# Patient Record
Sex: FEMALE | Race: WHITE | Employment: FULL TIME | URBAN - METROPOLITAN AREA
[De-identification: names, ages, dates, MRNs, and addresses within clinical notes are randomized per-mention and may not be internally consistent; named-entity substitution may affect disease eponyms.]

---

## 2018-01-31 ENCOUNTER — HOSPITAL ENCOUNTER (OUTPATIENT)
Age: 43
Discharge: HOME | End: 2018-01-31
Attending: FAMILY MEDICINE
Payer: COMMERCIAL

## 2018-01-31 VITALS
TEMPERATURE: 100 F | BODY MASS INDEX: 41.65 KG/M2 | SYSTOLIC BLOOD PRESSURE: 131 MMHG | OXYGEN SATURATION: 92 % | DIASTOLIC BLOOD PRESSURE: 79 MMHG | HEIGHT: 65 IN | HEART RATE: 80 BPM | RESPIRATION RATE: 18 BRPM | WEIGHT: 250 LBS

## 2018-01-31 DIAGNOSIS — J11.1 INFLUENZA: Primary | ICD-10-CM

## 2018-01-31 DIAGNOSIS — Z72.0 TOBACCO USE: ICD-10-CM

## 2018-01-31 PROCEDURE — 99204 OFFICE O/P NEW MOD 45 MIN: CPT

## 2018-01-31 PROCEDURE — 99203 OFFICE O/P NEW LOW 30 MIN: CPT

## 2018-01-31 RX ORDER — LOSARTAN POTASSIUM 50 MG/1
100 TABLET ORAL DAILY
COMMUNITY
End: 2020-02-27 | Stop reason: CLARIF

## 2018-01-31 RX ORDER — ALBUTEROL SULFATE 90 UG/1
2 AEROSOL, METERED RESPIRATORY (INHALATION) EVERY 4 HOURS PRN
Qty: 1 INHALER | Refills: 0 | Status: SHIPPED | OUTPATIENT
Start: 2018-01-31 | End: 2018-03-02

## 2018-01-31 RX ORDER — CHOLECALCIFEROL (VITAMIN D3) 125 MCG
1 CAPSULE ORAL
COMMUNITY

## 2018-02-01 NOTE — ED INITIAL ASSESSMENT (HPI)
Since Sunday, c/o fever, congested cough, sinus congestion and body aches.   Took Tylenol at 1000 this am.

## 2018-02-01 NOTE — ED PROVIDER NOTES
Patient Seen in: [facility]    History   Patient presents with:  Cough/URI    Stated Complaint: SICK SINCE SUNDAY    HPI    44-year-old female history of hypertension, new onset diabetes, and current tobacco use complains o are clear. MMM, Posterior pharynx is clear. No erythema. No exudate. Bilateral nares are clear. Neck: Supple, NT, FROM. No meningeal signs. LAD: No lymphadenopathy. Heart: S1,S2 RRR, No murmur  Lungs: CTA bilateral. No wheezes rales or rhonchi.   Skin:

## 2020-02-27 ENCOUNTER — HOSPITAL ENCOUNTER (OUTPATIENT)
Age: 45
Discharge: ANOTHER HOSPITAL | End: 2020-02-27
Attending: FAMILY MEDICINE
Payer: COMMERCIAL

## 2020-02-27 ENCOUNTER — APPOINTMENT (OUTPATIENT)
Dept: GENERAL RADIOLOGY | Age: 45
End: 2020-02-27
Attending: FAMILY MEDICINE
Payer: COMMERCIAL

## 2020-02-27 ENCOUNTER — HOSPITAL ENCOUNTER (EMERGENCY)
Facility: HOSPITAL | Age: 45
Discharge: HOME | End: 2020-02-27
Attending: EMERGENCY MEDICINE
Payer: COMMERCIAL

## 2020-02-27 VITALS
OXYGEN SATURATION: 95 % | RESPIRATION RATE: 20 BRPM | DIASTOLIC BLOOD PRESSURE: 76 MMHG | TEMPERATURE: 103 F | WEIGHT: 250 LBS | BODY MASS INDEX: 41.65 KG/M2 | HEART RATE: 88 BPM | SYSTOLIC BLOOD PRESSURE: 127 MMHG | HEIGHT: 65 IN

## 2020-02-27 VITALS
RESPIRATION RATE: 20 BRPM | TEMPERATURE: 99 F | OXYGEN SATURATION: 94 % | BODY MASS INDEX: 41.65 KG/M2 | HEIGHT: 65 IN | DIASTOLIC BLOOD PRESSURE: 71 MMHG | HEART RATE: 85 BPM | SYSTOLIC BLOOD PRESSURE: 131 MMHG | WEIGHT: 250 LBS

## 2020-02-27 DIAGNOSIS — R06.2 WHEEZING: Primary | ICD-10-CM

## 2020-02-27 DIAGNOSIS — J06.9 VIRAL URI WITH COUGH: ICD-10-CM

## 2020-02-27 DIAGNOSIS — J06.9 ACUTE URI: ICD-10-CM

## 2020-02-27 DIAGNOSIS — R09.02 HYPOXIA: ICD-10-CM

## 2020-02-27 LAB
ALBUMIN SERPL-MCNC: 3.4 G/DL (ref 3.4–5)
ALBUMIN SERPL-MCNC: 3.6 G/DL (ref 3.4–5)
ALBUMIN/GLOB SERPL: 0.8 {RATIO} (ref 1–2)
ALBUMIN/GLOB SERPL: 0.9 {RATIO} (ref 1–2)
ALP LIVER SERPL-CCNC: 77 U/L (ref 37–98)
ALP LIVER SERPL-CCNC: 77 U/L (ref 37–98)
ALT SERPL-CCNC: 23 U/L (ref 13–56)
ALT SERPL-CCNC: 25 U/L (ref 13–56)
ANION GAP SERPL CALC-SCNC: 5 MMOL/L (ref 0–18)
ANION GAP SERPL CALC-SCNC: 8 MMOL/L (ref 0–18)
AST SERPL-CCNC: 20 U/L (ref 15–37)
AST SERPL-CCNC: 21 U/L (ref 15–37)
BASOPHILS # BLD AUTO: 0.04 X10(3) UL (ref 0–0.2)
BASOPHILS # BLD AUTO: 0.04 X10(3) UL (ref 0–0.2)
BASOPHILS NFR BLD AUTO: 0.5 %
BASOPHILS NFR BLD AUTO: 0.6 %
BILIRUB SERPL-MCNC: 0.2 MG/DL (ref 0.1–2)
BILIRUB SERPL-MCNC: 0.4 MG/DL (ref 0.1–2)
BILIRUB UR QL STRIP.AUTO: NEGATIVE
BUN BLD-MCNC: 8 MG/DL (ref 7–18)
BUN BLD-MCNC: 8 MG/DL (ref 7–18)
BUN/CREAT SERPL: 8.3 (ref 10–20)
BUN/CREAT SERPL: 8.7 (ref 10–20)
CALCIUM BLD-MCNC: 8.6 MG/DL (ref 8.5–10.1)
CALCIUM BLD-MCNC: 8.6 MG/DL (ref 8.5–10.1)
CHLORIDE SERPL-SCNC: 98 MMOL/L (ref 98–112)
CHLORIDE SERPL-SCNC: 98 MMOL/L (ref 98–112)
CLARITY UR REFRACT.AUTO: CLEAR
CO2 SERPL-SCNC: 27 MMOL/L (ref 21–32)
CO2 SERPL-SCNC: 30 MMOL/L (ref 21–32)
COLOR UR AUTO: YELLOW
CREAT BLD-MCNC: 0.92 MG/DL (ref 0.55–1.02)
CREAT BLD-MCNC: 0.96 MG/DL (ref 0.55–1.02)
DEPRECATED RDW RBC AUTO: 39.4 FL (ref 35.1–46.3)
DEPRECATED RDW RBC AUTO: 39.8 FL (ref 35.1–46.3)
EOSINOPHIL # BLD AUTO: 0 X10(3) UL (ref 0–0.7)
EOSINOPHIL # BLD AUTO: 0.01 X10(3) UL (ref 0–0.7)
EOSINOPHIL NFR BLD AUTO: 0 %
EOSINOPHIL NFR BLD AUTO: 0.2 %
ERYTHROCYTE [DISTWIDTH] IN BLOOD BY AUTOMATED COUNT: 12.3 % (ref 11–15)
ERYTHROCYTE [DISTWIDTH] IN BLOOD BY AUTOMATED COUNT: 12.4 % (ref 11–15)
GLOBULIN PLAS-MCNC: 4.1 G/DL (ref 2.8–4.4)
GLOBULIN PLAS-MCNC: 4.5 G/DL (ref 2.8–4.4)
GLUCOSE BLD-MCNC: 185 MG/DL (ref 70–99)
GLUCOSE BLD-MCNC: 229 MG/DL (ref 70–99)
GLUCOSE UR STRIP.AUTO-MCNC: NEGATIVE MG/DL
HCT VFR BLD AUTO: 44.4 % (ref 35–48)
HCT VFR BLD AUTO: 45.3 % (ref 35–48)
HCT VFR BLD AUTO: 45.5 % (ref 35–48)
HGB BLD-MCNC: 14.9 G/DL (ref 12–16)
HGB BLD-MCNC: 14.9 G/DL (ref 12–16)
HGB BLD-MCNC: 15.3 G/DL (ref 12–16)
IMM GRANULOCYTES # BLD AUTO: 0.03 X10(3) UL (ref 0–1)
IMM GRANULOCYTES # BLD AUTO: 0.06 X10(3) UL (ref 0–1)
IMM GRANULOCYTES NFR BLD: 0.5 %
IMM GRANULOCYTES NFR BLD: 0.7 %
KETONES UR STRIP.AUTO-MCNC: NEGATIVE MG/DL
LEUKOCYTE ESTERASE UR QL STRIP.AUTO: NEGATIVE
LYMPHOCYTES # BLD AUTO: 0.89 X10(3) UL (ref 1–4)
LYMPHOCYTES # BLD AUTO: 1.61 X10(3) UL (ref 1–4)
LYMPHOCYTES NFR BLD AUTO: 10.8 %
LYMPHOCYTES NFR BLD AUTO: 24.6 %
M PROTEIN MFR SERPL ELPH: 7.7 G/DL (ref 6.4–8.2)
M PROTEIN MFR SERPL ELPH: 7.9 G/DL (ref 6.4–8.2)
MCH RBC QN AUTO: 28.9 PG (ref 26–34)
MCH RBC QN AUTO: 29.3 PG (ref 26–34)
MCH RBC QN AUTO: 29.4 PG (ref 26–34)
MCHC RBC AUTO-ENTMCNC: 32.9 G/DL (ref 31–37)
MCHC RBC AUTO-ENTMCNC: 33.6 G/DL (ref 31–37)
MCHC RBC AUTO-ENTMCNC: 33.6 G/DL (ref 31–37)
MCV RBC AUTO: 87.3 FL (ref 80–100)
MCV RBC AUTO: 87.4 FL (ref 80–100)
MCV RBC AUTO: 88 FL (ref 80–100)
MONOCYTES # BLD AUTO: 0.64 X10(3) UL (ref 0.1–1)
MONOCYTES # BLD AUTO: 0.99 X10(3) UL (ref 0.1–1)
MONOCYTES NFR BLD AUTO: 15.1 %
MONOCYTES NFR BLD AUTO: 7.8 %
NEUTROPHILS # BLD AUTO: 3.86 X10 (3) UL (ref 1.5–7.7)
NEUTROPHILS # BLD AUTO: 3.86 X10(3) UL (ref 1.5–7.7)
NEUTROPHILS # BLD AUTO: 6.59 X10 (3) UL (ref 1.5–7.7)
NEUTROPHILS # BLD AUTO: 6.59 X10(3) UL (ref 1.5–7.7)
NEUTROPHILS NFR BLD AUTO: 59 %
NEUTROPHILS NFR BLD AUTO: 80.2 %
NITRITE UR QL STRIP.AUTO: NEGATIVE
OSMOLALITY SERPL CALC.SUM OF ELEC: 279 MOSM/KG (ref 275–295)
OSMOLALITY SERPL CALC.SUM OF ELEC: 282 MOSM/KG (ref 275–295)
PATIENT FASTING Y/N/NP: NO
PH UR STRIP.AUTO: 6 [PH] (ref 4.5–8)
PLATELET # BLD AUTO: 246 10(3)UL (ref 150–450)
PLATELET # BLD AUTO: 246 X10ˆ3/UL (ref 150–450)
PLATELET # BLD AUTO: 262 10(3)UL (ref 150–450)
POCT INFLUENZA A: NEGATIVE
POCT INFLUENZA B: NEGATIVE
POTASSIUM SERPL-SCNC: 3.3 MMOL/L (ref 3.5–5.1)
POTASSIUM SERPL-SCNC: 3.3 MMOL/L (ref 3.5–5.1)
PROT UR STRIP.AUTO-MCNC: NEGATIVE MG/DL
RBC # BLD AUTO: 5.08 X10(6)UL (ref 3.8–5.3)
RBC # BLD AUTO: 5.15 X10ˆ6/UL (ref 3.8–5.3)
RBC # BLD AUTO: 5.21 X10(6)UL (ref 3.8–5.3)
SODIUM SERPL-SCNC: 133 MMOL/L (ref 136–145)
SODIUM SERPL-SCNC: 133 MMOL/L (ref 136–145)
SP GR UR STRIP.AUTO: 1.01 (ref 1–1.03)
UROBILINOGEN UR STRIP.AUTO-MCNC: <2 MG/DL
WBC # BLD AUTO: 6.5 X10(3) UL (ref 4–11)
WBC # BLD AUTO: 6.5 X10ˆ3/UL (ref 4–11)
WBC # BLD AUTO: 8.2 X10(3) UL (ref 4–11)

## 2020-02-27 PROCEDURE — 81001 URINALYSIS AUTO W/SCOPE: CPT | Performed by: NURSE PRACTITIONER

## 2020-02-27 PROCEDURE — 94640 AIRWAY INHALATION TREATMENT: CPT

## 2020-02-27 PROCEDURE — 36415 COLL VENOUS BLD VENIPUNCTURE: CPT

## 2020-02-27 PROCEDURE — 99284 EMERGENCY DEPT VISIT MOD MDM: CPT

## 2020-02-27 PROCEDURE — 80053 COMPREHEN METABOLIC PANEL: CPT | Performed by: NURSE PRACTITIONER

## 2020-02-27 PROCEDURE — 96360 HYDRATION IV INFUSION INIT: CPT

## 2020-02-27 PROCEDURE — 99215 OFFICE O/P EST HI 40 MIN: CPT

## 2020-02-27 PROCEDURE — 87502 INFLUENZA DNA AMP PROBE: CPT | Performed by: FAMILY MEDICINE

## 2020-02-27 PROCEDURE — 81002 URINALYSIS NONAUTO W/O SCOPE: CPT | Performed by: FAMILY MEDICINE

## 2020-02-27 PROCEDURE — 99214 OFFICE O/P EST MOD 30 MIN: CPT

## 2020-02-27 PROCEDURE — 71046 X-RAY EXAM CHEST 2 VIEWS: CPT | Performed by: FAMILY MEDICINE

## 2020-02-27 PROCEDURE — 80053 COMPREHEN METABOLIC PANEL: CPT | Performed by: FAMILY MEDICINE

## 2020-02-27 PROCEDURE — 85025 COMPLETE CBC W/AUTO DIFF WBC: CPT | Performed by: FAMILY MEDICINE

## 2020-02-27 RX ORDER — PREDNISONE 20 MG/1
20 TABLET ORAL 2 TIMES DAILY
Qty: 10 TABLET | Refills: 0 | Status: SHIPPED | OUTPATIENT
Start: 2020-02-27 | End: 2020-03-03

## 2020-02-27 RX ORDER — ALBUTEROL SULFATE 90 UG/1
2 AEROSOL, METERED RESPIRATORY (INHALATION) EVERY 4 HOURS PRN
Qty: 1 INHALER | Refills: 0 | Status: SHIPPED | OUTPATIENT
Start: 2020-02-27 | End: 2020-03-28

## 2020-02-27 RX ORDER — PREDNISONE 20 MG/1
60 TABLET ORAL ONCE
Status: COMPLETED | OUTPATIENT
Start: 2020-02-27 | End: 2020-02-27

## 2020-02-27 RX ORDER — METFORMIN HYDROCHLORIDE 500 MG/1
500 TABLET, EXTENDED RELEASE ORAL DAILY
COMMUNITY

## 2020-02-27 RX ORDER — IPRATROPIUM BROMIDE AND ALBUTEROL SULFATE 2.5; .5 MG/3ML; MG/3ML
3 SOLUTION RESPIRATORY (INHALATION) ONCE
Status: COMPLETED | OUTPATIENT
Start: 2020-02-27 | End: 2020-02-27

## 2020-02-27 RX ORDER — AMLODIPINE BESYLATE 5 MG/1
5 TABLET ORAL DAILY
COMMUNITY

## 2020-02-27 RX ORDER — IBUPROFEN 600 MG/1
600 TABLET ORAL ONCE
Status: COMPLETED | OUTPATIENT
Start: 2020-02-27 | End: 2020-02-27

## 2020-02-27 RX ORDER — BENZONATATE 100 MG/1
100 CAPSULE ORAL 3 TIMES DAILY PRN
Qty: 30 CAPSULE | Refills: 0 | Status: SHIPPED | OUTPATIENT
Start: 2020-02-27 | End: 2020-03-28

## 2020-02-27 NOTE — ED INITIAL ASSESSMENT (HPI)
Pt c/o cough and fever starting yesterday. Pt states she's having coughing spasms to the point where she throws up.

## 2020-02-27 NOTE — ED NOTES
Nebulizer complete. Pt. Reports feeling better after nebulizer treatment. Dr. Lakeshia Johnson updated.

## 2020-02-27 NOTE — ED PROVIDER NOTES
Patient Seen in: [facility]      History   Patient presents with:  Cough/URI  Fever    Stated Complaint: short of breath, fever x 2 days    HPI    ***48-year-old female presents with flulike symptoms since yesterday.   She h tympanic membrane is clear without any redness  Oropharynx : No erythema no exudates. Neck supple full range of motion,   Lungs: Diminished breath sounds bilaterally with expiratory wheezing bilaterally.   Heart S1-S2 heard no murmurs no gallops  Skin show

## 2020-02-28 NOTE — ED INITIAL ASSESSMENT (HPI)
Pt reports sob and cough for a couple of days. Went to intermediate care and they reported low pulse ox and sent her to the ED.

## 2020-02-28 NOTE — ED PROVIDER NOTES
Patient Seen in: [facility]      History   Patient presents with:  Cough/URI  Fever    Stated Complaint: short of breath, fever x 2 days    HPI    70-year-old female presents with complaints of shortness of breath or wheezi range of motion, no cervical lymphadenopathy   Heart S1-S2 heard no murmurs no gallops  Skin shows no rash  Diffuse expiratory wheezing with diminished breath sounds with mild respiratory distress appreciated.     ED Course     Labs Reviewed   POCT FLU TEST 6:44 pm    Follow-up:  [facility] SHEBHARATH [phone]  Go today          Medications Prescribed:  Current Discharge Medication List

## 2020-02-28 NOTE — ED PROVIDER NOTES
Patient Seen in: [facility] Emergency Department      History   Patient presents with:  Dyspnea CALLIE SOB    Stated Complaint: CALLIE sent from [address] [place] [address]    54-year-old female who presents to the emergency room from the local walk-in immediate care with complai of Systems   Constitutional: Positive for chills and fever. HENT: Positive for congestion. Respiratory: Positive for cough and wheezing. Skin: Negative. Hematological: Negative. Psychiatric/Behavioral: Negative.     All other systems reviewed Potassium 3.3 (*)     BUN/CREA Ratio 8.3 (*)     Globulin  4.5 (*)     A/G Ratio 0.8 (*)     All other components within normal limits   URINALYSIS WITH CULTURE REFLEX - Abnormal; Notable for the following components:    Blood Urine Small (*)     Squamous patient's oxygen saturation is at 95 to 96% on room air. Lungs are clear to auscultation bilaterally. Equal rise and fall of chest wall symmetrically. Patient is in no acute distress or respiratory stress at this time.      I discussed case with Dr. Daria Wilcox (three) times daily as needed for cough. Qty: 30 capsule Refills: 0    Albuterol Sulfate  (90 Base) MCG/ACT Inhalation Aero Soln  Inhale 2 puffs into the lungs every 4 (four) hours as needed for Wheezing.   Qty: 1 Inhaler Refills: 0

## 2024-06-24 ENCOUNTER — HOSPITAL ENCOUNTER (OUTPATIENT)
Facility: HOSPITAL | Age: 49
Setting detail: OBSERVATION
Discharge: HOME | End: 2024-06-25
Attending: STUDENT IN AN ORGANIZED HEALTH CARE EDUCATION/TRAINING PROGRAM
Payer: COMMERCIAL

## 2024-06-24 ENCOUNTER — APPOINTMENT (OUTPATIENT)
Dept: CT IMAGING | Facility: HOSPITAL | Age: 49
End: 2024-06-24
Attending: NURSE PRACTITIONER
Payer: COMMERCIAL

## 2024-06-24 ENCOUNTER — HOSPITAL ENCOUNTER (OUTPATIENT)
Age: 49
Discharge: HOME | End: 2024-06-24
Payer: COMMERCIAL

## 2024-06-24 ENCOUNTER — ANESTHESIA (OUTPATIENT)
Dept: SURGERY | Facility: HOSPITAL | Age: 49
End: 2024-06-24
Payer: COMMERCIAL

## 2024-06-24 VITALS
HEART RATE: 101 BPM | DIASTOLIC BLOOD PRESSURE: 83 MMHG | SYSTOLIC BLOOD PRESSURE: 123 MMHG | OXYGEN SATURATION: 94 % | BODY MASS INDEX: 36 KG/M2 | TEMPERATURE: 98 F | RESPIRATION RATE: 18 BRPM | WEIGHT: 215 LBS

## 2024-06-24 DIAGNOSIS — K35.80 ACUTE APPENDICITIS: ICD-10-CM

## 2024-06-24 DIAGNOSIS — R10.9 ABDOMINAL PAIN OF UNKNOWN ETIOLOGY: Primary | ICD-10-CM

## 2024-06-24 DIAGNOSIS — K35.80 ACUTE APPENDICITIS, UNSPECIFIED ACUTE APPENDICITIS TYPE: ICD-10-CM

## 2024-06-24 PROBLEM — E27.8 ADRENAL MASS (HCC): Status: ACTIVE | Noted: 2024-06-24

## 2024-06-24 PROBLEM — D72.829 LEUKOCYTOSIS: Status: ACTIVE | Noted: 2024-06-24

## 2024-06-24 LAB
#MXD IC: 1.9 X10ˆ3/UL (ref 0.1–1)
BASOPHILS # BLD AUTO: 0.07 X10(3) UL (ref 0–0.2)
BASOPHILS NFR BLD AUTO: 0.4 %
BUN BLD-MCNC: 7 MG/DL (ref 7–18)
CHLORIDE BLD-SCNC: 95 MMOL/L (ref 98–112)
CO2 BLD-SCNC: 31 MMOL/L (ref 21–32)
CREAT BLD-MCNC: 0.7 MG/DL
EGFRCR SERPLBLD CKD-EPI 2021: 105 ML/MIN/1.73M2 (ref 60–?)
EOSINOPHIL # BLD AUTO: 0.2 X10(3) UL (ref 0–0.7)
EOSINOPHIL NFR BLD AUTO: 1.2 %
ERYTHROCYTE [DISTWIDTH] IN BLOOD BY AUTOMATED COUNT: 12.6 %
EST. AVERAGE GLUCOSE BLD GHB EST-MCNC: 192 MG/DL (ref 68–126)
GLUCOSE BLD-MCNC: 207 MG/DL (ref 70–99)
GLUCOSE BLD-MCNC: 232 MG/DL (ref 70–99)
HBA1C MFR BLD: 8.3 % (ref ?–5.7)
HCG UR QL: NEGATIVE
HCT VFR BLD AUTO: 44.5 %
HCT VFR BLD AUTO: 44.9 %
HCT VFR BLD CALC: 48 %
HGB BLD-MCNC: 15.3 G/DL
HGB BLD-MCNC: 15.7 G/DL
IMM GRANULOCYTES # BLD AUTO: 0.17 X10(3) UL (ref 0–1)
IMM GRANULOCYTES NFR BLD: 1 %
ISTAT IONIZED CALCIUM FOR CHEM 8: 1.06 MMOL/L (ref 1.12–1.32)
LYMPHOCYTES # BLD AUTO: 2.5 X10ˆ3/UL (ref 1–4)
LYMPHOCYTES # BLD AUTO: 2.61 X10(3) UL (ref 1–4)
LYMPHOCYTES NFR BLD AUTO: 14.7 %
LYMPHOCYTES NFR BLD AUTO: 15.9 %
MCH RBC QN AUTO: 29.6 PG (ref 26–34)
MCH RBC QN AUTO: 30.1 PG (ref 26–34)
MCHC RBC AUTO-ENTMCNC: 34.1 G/DL (ref 31–37)
MCHC RBC AUTO-ENTMCNC: 35.3 G/DL (ref 31–37)
MCV RBC AUTO: 85.4 FL
MCV RBC AUTO: 86.8 FL (ref 80–100)
MIXED CELL %: 11.1 %
MONOCYTES # BLD AUTO: 1.21 X10(3) UL (ref 0.1–1)
MONOCYTES NFR BLD AUTO: 7.4 %
NEUTROPHILS # BLD AUTO: 12.15 X10 (3) UL (ref 1.5–7.7)
NEUTROPHILS # BLD AUTO: 12.15 X10(3) UL (ref 1.5–7.7)
NEUTROPHILS # BLD AUTO: 12.5 X10ˆ3/UL (ref 1.5–7.7)
NEUTROPHILS NFR BLD AUTO: 74.1 %
NEUTROPHILS NFR BLD AUTO: 74.2 %
PLATELET # BLD AUTO: 340 10(3)UL (ref 150–450)
POTASSIUM BLD-SCNC: 3.6 MMOL/L (ref 3.6–5.1)
RBC # BLD AUTO: 5.17 X10ˆ6/UL
RBC # BLD AUTO: 5.21 X10(6)UL
SODIUM BLD-SCNC: 133 MMOL/L (ref 136–145)
WBC # BLD AUTO: 16.4 X10(3) UL (ref 4–11)
WBC # BLD AUTO: 16.9 X10ˆ3/UL (ref 4–11)

## 2024-06-24 PROCEDURE — 99223 1ST HOSP IP/OBS HIGH 75: CPT | Performed by: SURGERY

## 2024-06-24 PROCEDURE — 99223 1ST HOSP IP/OBS HIGH 75: CPT | Performed by: STUDENT IN AN ORGANIZED HEALTH CARE EDUCATION/TRAINING PROGRAM

## 2024-06-24 PROCEDURE — 85025 COMPLETE CBC W/AUTO DIFF WBC: CPT | Performed by: NURSE PRACTITIONER

## 2024-06-24 PROCEDURE — 0DTJ4ZZ RESECTION OF APPENDIX, PERCUTANEOUS ENDOSCOPIC APPROACH: ICD-10-PCS | Performed by: SURGERY

## 2024-06-24 PROCEDURE — 74177 CT ABD & PELVIS W/CONTRAST: CPT | Performed by: NURSE PRACTITIONER

## 2024-06-24 PROCEDURE — 80047 BASIC METABLC PNL IONIZED CA: CPT | Performed by: NURSE PRACTITIONER

## 2024-06-24 PROCEDURE — 44970 LAPAROSCOPY APPENDECTOMY: CPT | Performed by: SURGERY

## 2024-06-24 PROCEDURE — 99205 OFFICE O/P NEW HI 60 MIN: CPT | Performed by: NURSE PRACTITIONER

## 2024-06-24 RX ORDER — NICOTINE POLACRILEX 4 MG
15 LOZENGE BUCCAL
Status: DISCONTINUED | OUTPATIENT
Start: 2024-06-24 | End: 2024-06-25

## 2024-06-24 RX ORDER — NICOTINE POLACRILEX 4 MG
30 LOZENGE BUCCAL
Status: DISCONTINUED | OUTPATIENT
Start: 2024-06-24 | End: 2024-06-25

## 2024-06-24 RX ORDER — BISACODYL 10 MG
10 SUPPOSITORY, RECTAL RECTAL
Status: DISCONTINUED | OUTPATIENT
Start: 2024-06-24 | End: 2024-06-25

## 2024-06-24 RX ORDER — PROCHLORPERAZINE EDISYLATE 5 MG/ML
5 INJECTION INTRAMUSCULAR; INTRAVENOUS EVERY 8 HOURS PRN
Status: DISCONTINUED | OUTPATIENT
Start: 2024-06-24 | End: 2024-06-25

## 2024-06-24 RX ORDER — SENNOSIDES 8.6 MG
17.2 TABLET ORAL NIGHTLY PRN
Status: DISCONTINUED | OUTPATIENT
Start: 2024-06-24 | End: 2024-06-25

## 2024-06-24 RX ORDER — ONDANSETRON 2 MG/ML
4 INJECTION INTRAMUSCULAR; INTRAVENOUS EVERY 6 HOURS PRN
Status: DISCONTINUED | OUTPATIENT
Start: 2024-06-24 | End: 2024-06-25

## 2024-06-24 RX ORDER — SODIUM CHLORIDE 9 MG/ML
INJECTION, SOLUTION INTRAVENOUS CONTINUOUS
Status: DISCONTINUED | OUTPATIENT
Start: 2024-06-24 | End: 2024-06-25

## 2024-06-24 RX ORDER — BENZONATATE 100 MG/1
200 CAPSULE ORAL 3 TIMES DAILY PRN
Status: DISCONTINUED | OUTPATIENT
Start: 2024-06-24 | End: 2024-06-25

## 2024-06-24 RX ORDER — POLYETHYLENE GLYCOL 3350 17 G/17G
17 POWDER, FOR SOLUTION ORAL DAILY PRN
Status: DISCONTINUED | OUTPATIENT
Start: 2024-06-24 | End: 2024-06-25

## 2024-06-24 RX ORDER — HYDRALAZINE HYDROCHLORIDE 20 MG/ML
5 INJECTION INTRAMUSCULAR; INTRAVENOUS EVERY 4 HOURS PRN
Status: DISCONTINUED | OUTPATIENT
Start: 2024-06-24 | End: 2024-06-25

## 2024-06-24 RX ORDER — GUAIFENESIN 600 MG/1
600 TABLET, EXTENDED RELEASE ORAL 2 TIMES DAILY PRN
Status: DISCONTINUED | OUTPATIENT
Start: 2024-06-24 | End: 2024-06-25

## 2024-06-24 RX ORDER — LABETALOL HYDROCHLORIDE 5 MG/ML
10 INJECTION, SOLUTION INTRAVENOUS EVERY 4 HOURS PRN
Status: DISCONTINUED | OUTPATIENT
Start: 2024-06-24 | End: 2024-06-25

## 2024-06-24 RX ORDER — MORPHINE SULFATE 2 MG/ML
2 INJECTION, SOLUTION INTRAMUSCULAR; INTRAVENOUS EVERY 2 HOUR PRN
Status: DISCONTINUED | OUTPATIENT
Start: 2024-06-24 | End: 2024-06-25

## 2024-06-24 RX ORDER — MELATONIN
3 NIGHTLY PRN
Status: DISCONTINUED | OUTPATIENT
Start: 2024-06-24 | End: 2024-06-25

## 2024-06-24 RX ORDER — MORPHINE SULFATE 4 MG/ML
4 INJECTION, SOLUTION INTRAMUSCULAR; INTRAVENOUS EVERY 2 HOUR PRN
Status: DISCONTINUED | OUTPATIENT
Start: 2024-06-24 | End: 2024-06-25

## 2024-06-24 RX ORDER — ENEMA 19; 7 G/133ML; G/133ML
1 ENEMA RECTAL ONCE AS NEEDED
Status: DISCONTINUED | OUTPATIENT
Start: 2024-06-24 | End: 2024-06-25

## 2024-06-24 RX ORDER — MORPHINE SULFATE 2 MG/ML
1 INJECTION, SOLUTION INTRAMUSCULAR; INTRAVENOUS EVERY 2 HOUR PRN
Status: DISCONTINUED | OUTPATIENT
Start: 2024-06-24 | End: 2024-06-25

## 2024-06-24 RX ORDER — ECHINACEA PURPUREA EXTRACT 125 MG
1 TABLET ORAL
Status: DISCONTINUED | OUTPATIENT
Start: 2024-06-24 | End: 2024-06-25

## 2024-06-24 RX ORDER — DEXTROSE MONOHYDRATE 25 G/50ML
50 INJECTION, SOLUTION INTRAVENOUS
Status: DISCONTINUED | OUTPATIENT
Start: 2024-06-24 | End: 2024-06-25

## 2024-06-24 RX ORDER — ACETAMINOPHEN 500 MG
500 TABLET ORAL EVERY 4 HOURS PRN
Status: DISCONTINUED | OUTPATIENT
Start: 2024-06-24 | End: 2024-06-25

## 2024-06-24 NOTE — H&P
[facility]     Camila Rayo Patient Status:  Observation    1975 MRN [id number]   Location [facility] 3NW-A Attending Jovani Story,    Hosp Day # 0 PCP Maia Santana MD     Chief Complaint: Abdominal pain    Subjective:    History of Present Illness:     Camila Rayo is a 49 year old female with past medical history of diabetes, hypertension, depression, anxiety, ADHD, obesity who presents ED for abdominal pain.  Patient has had 4 days of generalized abdominal pain and bloating.  She has not passed gas over the past 4 days.  She had a few episodes of vomiting.  She thinks her last bowel meant was about a week ago.    History/Other:    Past Medical History:  Past Medical History:    ADHD (attention deficit hyperactivity disorder)    ANXIETY    DEPRESSION    Diabetes (HCC)    Obesity (BMI 30-39.9)     Past Surgical History:   Past Surgical History:   Procedure Laterality Date          x2      Family History:   Family History   Problem Relation Age of Onset    Heart Disease Maternal Grandmother     Heart Disorder Maternal Grandmother         CHF    Mental Disorder Brother         anxiety    Mental Disorder Brother         anxiety    Breast Cancer Neg     Diabetes Neg         anxiety    Hypertension Neg     Cancer Maternal Grandfather         prostate CA     Social History:    reports that she has been smoking cigarettes. She has never used smokeless tobacco. She reports current alcohol use. She reports that she does not use drugs.     Allergies:   Allergies   Allergen Reactions    Wellbutrin [Bupropion] HIVES and SWELLING       Medications:    No current facility-administered medications on file prior to encounter.     Current Outpatient Medications on File Prior to Encounter   Medication Sig Dispense Refill    amLODIPine Besylate 5 MG Oral Tab Take 5 mg by mouth daily. (Patient not taking: Reported on 2024)      metFORMIN HCl  MG Oral Tablet 24 Hr  Take 1 tablet (500 mg total) by mouth daily.      Clobetasol Propionate 0.05 % External Ointment Use on AA BID for 1-2 weeks 50 g 1    Cholecalciferol (VITAMIN D3) 2000 units Oral Tab Take 1 tablet (2,000 Units total) by mouth every 7 days. On Saturday      Citalopram Hydrobromide (CELEXA) 40 MG Oral Tab Take 1 tablet by mouth daily. 30 tablet 6       Review of Systems:   A comprehensive review of systems was completed.    Pertinent positives and negatives noted in the HPI.    Objective:   Physical Exam:    There were no vitals taken for this visit.  General: No acute distress, Alert  Respiratory: No rhonchi, no wheezes  Cardiovascular: S1, S2. Regular rate and rhythm  Abdomen: Soft, diffuse TTP, non-distended, positive bowel sounds  Neuro: No new focal deficits  Extremities: No edema    Results:    Labs:      Labs Last 24 Hours:    Recent Labs   Lab 06/24/24  1206   RBC 5.21   HGB 15.7   HCT 44.5   MCV 86.8  85.4   MCH 30.1   MCHC 34.1  35.3   RDW 12.6   NEPRELIM 12.15*   WBC 16.4*   .0       Recent Labs   Lab 06/24/24  1210   EGFRCR 105       No results found for: \"PT\", \"INR\"    No results for input(s): \"TROP\", \"TROPHS\", \"CK\" in the last 168 hours.    No results for input(s): \"TROP\", \"PBNP\" in the last 168 hours.    No results for input(s): \"PCT\" in the last 168 hours.    Imaging: Imaging data reviewed in Epic.    Assessment & Plan:      #Acute appendicitis  #Possible periappendiceal abscesses  -CT abdomen pelvis reviewed  -N.p.o.  -IV fluids  -Pain control and antiemetics as needed  -Surgery consulted    #Diabetes type 2  -Hyperglycemia protocol    #Hypertension  -Hold amlodipine while n.p.o.  -IV as needed antihypertensives    #Anxiety  #Depression  -Hold citalopram while n.p.o.        Plan of care discussed with patient     Jovani Story DO    Supplementary Documentation:     The 21st Century Cures Act makes medical notes like these available to patients in the interest of transparency. Please be  advised this is a medical document. Medical documents are intended to carry relevant information, facts as evident, and the clinical opinion of the practitioner. The medical note is intended as peer to peer communication and may appear blunt or direct. It is written in medical language and may contain abbreviations or verbiage that are unfamiliar.

## 2024-06-24 NOTE — DISCHARGE INSTRUCTIONS
Go straight to [facility] for your outpatient CAT scan of the abdomen.  Do not eat or drink anything until further instructions.  If you get worse, please go to the emergency department.    If the CAT scan does not show any findings that would suggest you need hospital admission or immediate surgery, please follow-up closely with your primary care doctor and the gastroenterologist.  Please call both of them either today or tomorrow to arrange an appointment.  However if symptoms do get worse, please go to the emergency department.

## 2024-06-24 NOTE — PROGRESS NOTES
NURSING ADMISSION NOTE      Patient admitted via Wheelchair  Oriented to room.  Safety precautions initiated.  Bed in low position.  Call light in reach.  Navigators completed.   Hospitalist consulted.     Upon assessment, A&Ox4. RA.  WDL. SCDs. NPO. Abd tender. IVF infusing. Voids. Pain tolerable at this time. Denies SOB, CP, lightheadedness, and N/V. POC discussed w/ pt and family at bedside, all questions answered and concerns addressed. Safety precautions in place. Frequent rounds performed.

## 2024-06-24 NOTE — CONSULTS
[facility]  Report of Consultation    Camila Rayo Patient Status:  Observation    1975 MRN [id number]   Location [facility] 3NW-A Attending Jovani Story,    Hosp Day # 0 PCP Maia Santana MD     Requesting Physician:  Malcom MCMILLAN    Reason for Consultation:  Acute appendicitis    Chief Complaint:  Abdominal pain    History of Present Illness:  Camila Rayo is a 49 year old female who presents to [facility] with worsening abdominal pain.    She reports gradual onset of periumbilical abdominal pain which began Friday afternoon.  She states her pain slowly radiated to her her right lower quadrant.  She describes the pain as constant since onset and dull and achy in nature.  She did take Tylenol with some relief.  She states her pain increases in intensity with movement and increased abdominal pressure.  She reports associated nausea and 2 episodes of emesis on Saturday.  She denies associated fevers or chills.  She has not passed flatus over the past 4 days.    Of note, the patient reports previous 3-4 previous episodes of abdominal pain.  Her last episode was about 2 months ago.  She states this episode was the most severe which led her to present to the ER.    Upon presentation to the ER, the patient Tmax of 100.4 and normotensive.  CBC reveals leukocytosis at 16.4 with left shift of 12.15, hemoglobin 15.7, platelets 340,000.  Slightly hyponatremic at 133, hypochloremic at 95.  No acute kidney injury.  Hyperglycemic at 232.    CT of the abdomen and pelvis reveals distended appendix measuring up to 1.2 cm with mucosal enhancement and marked Chintan appendiceal inflammatory changes consistent with acute appendicitis.  Adjacent areas of focal fluid measure up to 1.8 cm.  Adjacent terminal ileum and cecum demonstrate wall thickening which is most likely secondary.  Of note, bilateral adrenal nodules which measure up to 2.6X 2.0 cm on the right and 1.7X 1.5 cm on the left is  seen.    The patient has a significant past medical history of type 2 diabetes and anxiety.  She denies past surgical history.  She denies taking blood thinning medications.        History:  Past Medical History:    ADHD (attention deficit hyperactivity disorder)    ANXIETY    DEPRESSION    Diabetes (HCC)    Obesity (BMI 30-39.9)     Past Surgical History:   Procedure Laterality Date          x2     Family History   Problem Relation Age of Onset    Heart Disease Maternal Grandmother     Heart Disorder Maternal Grandmother         CHF    Mental Disorder Brother         anxiety    Mental Disorder Brother         anxiety    Breast Cancer Neg     Diabetes Neg         anxiety    Hypertension Neg     Cancer Maternal Grandfather         prostate CA      reports that she has been smoking cigarettes. She has never used smokeless tobacco. She reports current alcohol use. She reports that she does not use drugs.    Allergies:  Allergies   Allergen Reactions    Wellbutrin [Bupropion] HIVES and SWELLING       Medications:  Medications Prior to Admission   Medication Sig    Citalopram Hydrobromide (CELEXA) 40 MG Oral Tab Take 1 tablet by mouth daily.    amLODIPine Besylate 5 MG Oral Tab Take 5 mg by mouth daily. (Patient not taking: Reported on 2024)    metFORMIN HCl  MG Oral Tablet 24 Hr Take 1 tablet (500 mg total) by mouth daily.    Clobetasol Propionate 0.05 % External Ointment Use on AA BID for 1-2 weeks    Cholecalciferol (VITAMIN D3) 2000 units Oral Tab Take 1 tablet (2,000 Units total) by mouth every 7 days. On Saturday         Current Facility-Administered Medications:     insulin aspart (NovoLOG) 100 Units/mL FlexPen 1-68 Units, 1-68 Units, Subcutaneous, TID CC    glucose (Dex4) 15 GM/59ML oral liquid 15 g, 15 g, Oral, Q15 Min PRN **OR** glucose (Glutose) 40% oral gel 15 g, 15 g, Oral, Q15 Min PRN **OR** glucose-vitamin C (Dex-4) chewable tab 4 tablet, 4 tablet, Oral, Q15 Min PRN **OR** dextrose 50%  injection 50 mL, 50 mL, Intravenous, Q15 Min PRN **OR** glucose (Dex4) 15 GM/59ML oral liquid 30 g, 30 g, Oral, Q15 Min PRN **OR** glucose (Glutose) 40% oral gel 30 g, 30 g, Oral, Q15 Min PRN **OR** glucose-vitamin C (Dex-4) chewable tab 8 tablet, 8 tablet, Oral, Q15 Min PRN    insulin aspart (NovoLOG) 100 Units/mL FlexPen 1-30 Units, 1-30 Units, Subcutaneous, TID AC and HS    acetaminophen (Tylenol Extra Strength) tab 500 mg, 500 mg, Oral, Q4H PRN    melatonin tab 3 mg, 3 mg, Oral, Nightly PRN    guaiFENesin ER (Mucinex) 12 hr tab 600 mg, 600 mg, Oral, BID PRN    benzonatate (Tessalon) cap 200 mg, 200 mg, Oral, TID PRN    glycerin-hypromellose- (Artificial Tears) 0.2-0.2-1 % ophthalmic solution 1 drop, 1 drop, Both Eyes, QID PRN    sodium chloride (Saline Mist) 0.65 % nasal solution 1 spray, 1 spray, Each Nare, Q3H PRN    sodium chloride 0.9% infusion, , Intravenous, Continuous    ondansetron (Zofran) 4 MG/2ML injection 4 mg, 4 mg, Intravenous, Q6H PRN    prochlorperazine (Compazine) 10 MG/2ML injection 5 mg, 5 mg, Intravenous, Q8H PRN    polyethylene glycol (PEG 3350) (Miralax) 17 g oral packet 17 g, 17 g, Oral, Daily PRN    sennosides (Senokot) tab 17.2 mg, 17.2 mg, Oral, Nightly PRN    bisacodyl (Dulcolax) 10 MG rectal suppository 10 mg, 10 mg, Rectal, Daily PRN    fleet enema (Fleet) 7-19 GM/118ML rectal enema 133 mL, 1 enema, Rectal, Once PRN    morphINE PF 2 MG/ML injection 1 mg, 1 mg, Intravenous, Q2H PRN **OR** morphINE PF 2 MG/ML injection 2 mg, 2 mg, Intravenous, Q2H PRN **OR** morphINE PF 4 MG/ML injection 4 mg, 4 mg, Intravenous, Q2H PRN    hydrALAzine (Apresoline) 20 mg/mL injection 5 mg, 5 mg, Intravenous, Q4H PRN    labetalol (Trandate) 5 mg/mL injection 10 mg, 10 mg, Intravenous, Q4H PRN    Review of Systems:  Pertinent items are noted in HPI.  Allergic/Immuno:  Review of patient's allergies performed.  Cardiovascular:  Negative for cool extremity and irregular  heartbeat/palpitations  Constitutional:  Negative for decreased activity, fever, irritability and lethargy  Endocrine:  Negative for abnormal sleep patterns, increased activity, polydipsia and polyphagia  ENMT:  Negative for ear drainage, hearing loss and nasal drainage  Eyes:  Negative for eye discharge and vision loss  Gastrointestinal:  Positive for abdominal pain, nausea, vomiting, abdominal distention.    Genitourinary:  Negative for dysuria and hematuria  Hema/Lymph:  Negative for easy bleeding and easy bruising  Integumentary:  Negative for pruritus and rash  Musculoskeletal:  Negative for bone/joint symptoms  Neurological:  Negative for gait disturbance  Psychiatric:  Negative for inappropriate interaction and psychiatric symptoms  Respiratory:  Negative for cough, dyspnea and wheezing      Physical Exam:  Blood pressure 123/74, pulse 86, temperature 100.4 °F (38 °C), temperature source Oral, resp. rate 18, weight 226 lb 1.6 oz (102.6 kg), SpO2 99%.    General: Alert, orientated x3.  Cooperative.  No apparent distress.    HEENT: Exam is unremarkable.  Without scleral icterus.  Mucous membranes are moist. EOM are WNL.    Neck: No tenderness to palpitation.  Full range of motion to flexion and extension, lateral rotation and lateral flexion of cervical spine.  No JVD. Supple.     Lungs: Clear to auscultation bilaterally. No wheezes, no rales, no rhonchi. Good excursion of the diaphragms. No secondary use of accessory respiratory musculature.    Cardiac: Regular rate and rhythm. No murmur.    Abdomen:  Soft, distended, tender to palpation in right abdomen specifically right lower quadrant, tympanic to percussion. No peritoneal signs. No guarding.  Positive Rovsing sign.  Negative psoas and obturator sign.    Extremities:  No lower extremity edema noted.  Without clubbing or cyanosis.      Skin: Normal texture and turgor.    Laboratory Data:  Recent Labs   Lab 06/24/24  1206   RBC 5.21   HGB 15.7   HCT 44.5    MCV 86.8  85.4   MCH 30.1   MCHC 34.1  35.3   RDW 12.6   NEPRELIM 12.15*   WBC 16.4*   .0       No results for input(s): \"GLU\", \"BUN\", \"CREATSERUM\", \"GFRAA\", \"GFRNAA\", \"CA\", \"ALB\", \"NA\", \"K\", \"CL\", \"CO2\", \"ALKPHO\", \"AST\", \"ALT\", \"BILT\", \"TP\" in the last 168 hours.      No results for input(s): \"PTP\", \"INR\", \"PTT\" in the last 168 hours.      CT APPENDIX ABD/PEL W CONTRAST (CPT=74177)    Addendum Date: 6/24/2024    ADDENDUM:  Bilateral adrenal nodules incompletely characterized on this exam.  Recommend CT of the abdomen using adrenal gland protocol for further evaluation.  Dictated by (CST): Amna Sánchez MD on 6/24/2024 at 4:13 PM     Finalized by (CST): Amna Sánchez MD on 6/24/2024 at 4:13 PM             Result Date: 6/24/2024  CONCLUSION:  1. Findings most consistent with acute appendicitis.  At least 2 small adjacent focal fluid collections are present, perforation with periappendiceal abscesses cannot be excluded, correlate clinically.  Findings discussed with clinician BLANCA Moncada at the time of this dictation.   LOCATION:  [facility]   Dictated by (CST): Amna Sánchez MD on 6/24/2024 at 2:04 PM     Finalized by (CST): Amna Sánchez MD on 6/24/2024 at 2:14 PM          ALANNAH Crystal  6/24/2024  4:49 PM    Is this a shared or split note between Advanced Practice Provider and Physician? Yes      Medical Decision Making         Impression:  Patient Active Problem List   Diagnosis    Anxiety    Tobacco use    Obesity (BMI 30-39.9)    ADD (attention deficit disorder)    Elbow strain, right, initial encounter    DEPRESSION    Appendicitis, acute       49-year-old female who presents to the emergency department with complaints of abdominal pain.  Workup in the emergency department revealed a leukocytosis of 16.4.  CT scan abdomen pelvis revealed bilateral adrenal nodules measuring 2.6 cm on the right and 1.7 cm on the left.  Small umbilical fat-containing hernia and fat-containing right inguinal hernia is also noted.  The  appendix is noted to be dilated to 12 mm with mucosal enhancement and periappendiceal inflammatory changes consistent with acute appendicitis.  Focal fluid collection measuring up to 1.8 cm adjacent to the appendix is noted.  Findings are consistent with acute appendicitis.  Perforation and abscess cannot be excluded.  Treatment options were reviewed in detail with Camila.  At this time the recommendation is to proceed with laparoscopic appendectomy.  Possible need for placement of drain, possible conversion to open appendectomy given CT scan findings and possibility of perforation was discussed in detail with the patient.  She has no further questions at this time and will proceed with surgery today as discussed.  Additionally we did discuss the presence of bilateral adrenal nodules.  The patient will follow-up with her PCP for further evaluation and management.    The risks, benefits, complications, possible outcomes and alternatives of the procedure were explained to the patient in detail.  Potential complications of this procedure and as with any surgical procedure and anesthesia were reviewed including but not limited to bleeding, infection, thromboembolic event, pneumonia were discussed.  Expected postoperative pain, recuperation and postoperative course and possible complications were also reviewed.  All questions from the patient were answered in detail.  The patient did verbalize understanding and does not have any further questions at this time.  The patient does wish to proceed with the proposed procedure.      TONG Murcia MD, FACS    Please note that this report has been produced using speech recognition software and may contain errors related to that system including but not limited to errors in grammar, punctuation and spelling as well as words and phrases that possibly may have been recognized inappropriately.  If there are any questions or concerns please contact the dictating provider for  clarification.  The 21st Century Cures Act makes medical notes like these available to patients in the interest of trans parency. Please be advised this is a medical document. Medical documents are intended to carry relevant information, facts as evident, and the clinical opinion of the practitioner. The medical note is intended as peer to peer communication and may appear blunt or direct. It is written in medical language and may contain abbreviations or verbiage that are unfamiliar.  If there are any questions or concerns please contact the dictating provider for clarification.

## 2024-06-24 NOTE — ED INITIAL ASSESSMENT (HPI)
Pain to entire abd.  + bloating & cramping w movement.  Possible constipation.  Unknown when last BM was.  Some amt bile emesis x 2 on Saturday.  Not belching or passing gas..  Denies UTI s/s.  Denies abd surgeries.

## 2024-06-24 NOTE — ED PROVIDER NOTES
Patient Seen in: [facility]      History     Chief Complaint   Patient presents with    Abdomen/Flank Pain     Stated Complaint: abd pain x 3 days    Subjective:   49-year-old female, accompanied by an adult member.  States she has had 4 days of generalized abdominal pain, bloating.  States she has not passed gas for the last 4 days.  States she did have a couple episodes of vomiting including yesterday, however for the most part has been keeping food and fluids down.  States she does not remember the last time she had a bowel movement she thinks it was about a week ago.  No recent travel.  No diarrhea.  No bloody stool.  Denies prior abdominal surgeries.  Denies chance for pregnancy.  Denies respiratory distress.            Objective:   Past Medical History:    ADHD (attention deficit hyperactivity disorder)    ANXIETY    DEPRESSION    Diabetes (HCC)    Obesity (BMI 30-39.9)              Past Surgical History:   Procedure Laterality Date          x2                Social History     Socioeconomic History    Marital status:     Number of children: 1   Occupational History    Occupation: a/r representative   Tobacco Use    Smoking status: Every Day     Types: Cigarettes    Smokeless tobacco: Never    Tobacco comments:     1/2ppd   Vaping Use    Vaping status: Never Used   Substance and Sexual Activity    Alcohol use: Yes     Comment: 1 dk/yr    Drug use: No    Sexual activity: Not Currently     Partners: Male     Comment: single   Social History Narrative    occupation: healthcare software, marital status: , children: 12, 2 1/2    tobacco: 1/2 ppd- thinking of quit, etoh: 1dk/yr, illicits: no    sexually active: no     Social Determinants of Health      Received from [facility]    Food Insecurity    Received from [facility]    Family and Community Support    Received from [facility]    Housing Stability              Review  of Systems   Constitutional: Negative.    Respiratory:  Negative for shortness of breath.    Gastrointestinal:  Positive for abdominal pain, constipation and vomiting. Negative for blood in stool and nausea.   Genitourinary: Negative.    All other systems reviewed and are negative.      Positive for stated complaint: abd pain x 3 days  Other systems are as noted in HPI.  Constitutional and vital signs reviewed.      All other systems reviewed and negative except as noted above.    Physical Exam     ED Triage Vitals [06/24/24 1120]   /83   Pulse 101   Resp 18   Temp 98 °F (36.7 °C)   Temp src Temporal   SpO2 94 %   O2 Device None (Room air)       Current Vitals:   Vital Signs  BP: 123/83  Pulse: 101  Resp: 18  Temp: 98 °F (36.7 °C)  Temp src: Temporal    Oxygen Therapy  SpO2: 94 %  O2 Device: None (Room air)            Physical Exam  Vitals and nursing note reviewed.   Constitutional:       Appearance: She is well-developed. She is not ill-appearing or toxic-appearing.   Cardiovascular:      Rate and Rhythm: Normal rate and regular rhythm.   Pulmonary:      Effort: Pulmonary effort is normal. No respiratory distress.   Abdominal:      General: Abdomen is flat.      Palpations: Abdomen is soft.      Tenderness: There is generalized abdominal tenderness. There is no guarding or rebound.   Skin:     General: Skin is warm and dry.      Coloration: Skin is not jaundiced or pale.      Findings: No rash.   Neurological:      General: No focal deficit present.      Mental Status: She is alert and oriented to person, place, and time.               ED Course     Labs Reviewed   POCT CBC - Abnormal; Notable for the following components:       Result Value    WBC IC 16.9 (*)     # Neutrophil 12.5 (*)     # Mixed Cells 1.9 (*)     All other components within normal limits   POCT ISTAT CHEM8 CARTRIDGE - Abnormal; Notable for the following components:    ISTAT Sodium 133 (*)     ISTAT Chloride 95 (*)     ISTAT Ionized Calcium  1.06 (*)     ISTAT Glucose 232 (*)     All other components within normal limits   CBC W AUTO DIFF     CT APPENDIX ABD/PEL W CONTRAST (CPT=74177)    Result Date: 6/24/2024  PROCEDURE:  CT APPENDIX ABD/PEL W CONTRAST (CPT=74177)  COMPARISON:  None.  INDICATIONS:  abd pain x 3 days  TECHNIQUE:  Axial helical acquisitions are obtained from through the abdomen and pelvis after bolus intravenous nonionic contrast administration.  Images of the right lower quadrant reconstructed at 2.5 mm. Coronal MPR imaging was obtained.  Dose reduction techniques were used. Dose information is transmitted to the ACR (American College of Radiology) NRDR (National Radiology Data Registry) which includes the Dose Index Registry.  PATIENT STATED HISTORY:(As transcribed by Technologist)  RLQ,LLQ pain for 3 days.   CONTRAST USED:  100cc of Isovue 370  FINDINGS:  APPENDIX:  Distended measuring up to 1.2 cm with mucosal enhancement and marked periappendiceal inflammatory changes consistent with acute appendicitis.  Adjacent areas of focal fluid measure up to 1.8 cm.  The adjacent terminal ileum and cecum demonstrate wall thickening most likely secondary. LIVER:  Diffuse low attenuation is most consistent with fatty infiltration.  No enlargement, atrophy, or significant focal lesion.  BILIARY:  No visible dilatation or calcification.  PANCREAS:  No lesion, fluid collection, ductal dilatation, or atrophy.  SPLEEN:  No enlargement or focal lesion.  KIDNEYS:  No mass, obstruction, or calcification.  ADRENALS:  Bilateral adrenal nodules measure up to 2.6 x 2.0 cm on the right and 1.7 x 1.5 cm on the left.  AORTA/VASCULAR:  No aneurysm.  RETROPERITONEUM:  No mass or adenopathy.  BOWEL/MESENTERY:  Normal caliber small and large bowel.  Bowel wall thickening involves the terminal ileum as well as the cecum may be secondary from acute appendicitis.    ABDOMINAL WALL:  Small umbilical and right inguinal fat containing hernias.  URINARY BLADDER:  Bladder  is not well distended may account for diffuse bladder wall thickening, correlate clinically with cystitis.  No visible focal wall thickening, lesion, or calculus.  PELVIC NODES:  No adenopathy.  PELVIC ORGANS:  Pelvic organs appropriate for patient age.  BONES:  Lower lumbar facet joint arthropathy.  No fracture.  LUNG BASES:  No visible pulmonary or pleural disease.  OTHER:  Small hiatal hernia.             CONCLUSION:  1. Findings most consistent with acute appendicitis.  At least 2 small adjacent focal fluid collections are present, perforation with periappendiceal abscesses cannot be excluded, correlate clinically.  Findings discussed with clinician BLANCA Moncada at the time of this dictation.   LOCATION:  [facility]   Dictated by (CST): Amna Sánchez MD on 6/24/2024 at 2:04 PM     Finalized by (CST): Amna Sánchez MD on 6/24/2024 at 2:14 PM                       MDM                                         Medical Decision Making  49-year-old female, nontoxic-appearing with generalized abdominal pain.  Tenderness throughout, more so to the lower abdomen.  No respiratory distress.  Differentials include constipation versus acute abdomen, including bowel obstruction.  Discussed this case with my supervising physician for today, Dr. Gordon.  Will check a CBC and chemistry here.  As long as kidney functions are okay, will send for outpatient CT, and call patient with results.  If abnormal will need to go to the ER.  If CT is normal, will send home, close follow-up with PCP and gastroenterology.  Patient voices understanding agrees with the plan of care.  Understands not to eat or drink anything until further instructions.  Creatinine within normal limits.  Will order outpatient scan.  Patient left in stable condition.  She understands I will call with the results.    Got results back.  CT findings concerning for acute appendicitis.  Spoke with the patient.  Informed her to the not leave the hospital, and I will call her back.  I  did speak with the  who told me there is a room available.  I am waiting on the [facility] general surgeon on-call to call back.  Spoke with Dr. Murcia, aware patient to be admitted to [facility].  Spoke with Dr. DASHAWN Story, [facility].  Directly admitted to [facility] in [place].        Speech recognition software was used during this dictation.  There may be minor errors in transcription.                      Amount and/or Complexity of Data Reviewed  Labs: ordered. Decision-making details documented in ED Course.  Radiology: ordered. Decision-making details documented in ED Course.  Discussion of management or test interpretation with external provider(s): Dr. Divina Story        Disposition and Plan     Clinical Impression:  1. Abdominal pain of unknown etiology    2. Acute appendicitis, unspecified acute appendicitis type         Disposition:  Discharge  6/24/2024 12:44 pm    Follow-up:  Geremias Lennon MD  [address]  [facility] [place] [address]  [phone]    Call       Maia Santana MD  [address] [place] [address] [address]  [facility] [place] [id number] [phone]    Call             Medications Prescribed:  Current Discharge Medication List

## 2024-06-24 NOTE — DISCHARGE INSTRUCTIONS
Home Care Instructions  Appendectomy  Dr Rea Murcia    MEDICATIONS  For post-operative pain control the medications are usually Norco-5 or Tylenol #3.  These are narcotics and are best taken by starting with one tablet and repeating every four to six hours as needed.  If the patient does not feel they need the narcotics, they shouldn’t take them.  If the pain is severe, the patient may take up to two pills every four hours.  If a minor supplement is necessary in addition to the narcotics do not overlap with Tylenol (any product with acetaminophen) as both Norco and Tylenol #3 contain Tylenol.  Please supplement with ibuprofen (Advil or Motrin).  Please ask your surgeon before resuming blood thinners such as aspirin, Plavix or Coumadin.  All other home medications may be resumed as scheduled.  With severe appendicitis, antibiotics will also be prescribed.  With antibiotics, please watch for rash, facial swelling or severe diarrhea.    DIET  The patient may resume a general diet immediately.  This is not a good time to eat excessively.  The patient should eat in moderation and stick with foods the patient feels are easy to digest.  There should be no alcohol consumption in the immediate recover time period or within six hours of taking narcotics.    WOUND CARE  The top dressing may be removed the day after surgery.  This includes the gauze, tape and band-aids if they are present.  Do not remove the steri-strips or butterfly tapes that are white and adherent to the skin.  The steri-strips will eventually peel up at the ends and at this point they may be removed.  This is usually seven to ten days after surgery.  The patient may shower the day after surgery.  There is no need to cover the incisions and all top gauze type dressings should be removed prior to showering.  Soap can get on the wounds but do not scrub over the wounds.  No hair dye or chemicals of any kind should get in the wounds.  Avoid tub baths for two  weeks.  If visible sutures or staples are present they will be removed in the office by the surgeon or nurse.  Most wounds will be closed with dissolving suture underneath the skin.  These sutures will dissolve on their own.    ACTIVITY  Every day the patient should be up, showered and dressed.  Each day the patient should be up and around the house.  The patient should not lie in bed and should not stay in pajamas.  We count on the patient being up, coughing, walking and deep breathing to avoid pneumonia and blood clots in the legs.  O.  The patient may ride in a car but should not drive the car for at least one week.  Patients should be off narcotics for at least 8 hours prior to driving.  The average time off work is 10 to 14 days; most adults will be seeing the surgeon prior to returning to work.  Students will return to school within 1-5 days after discharge from the hospital but will be off gym, sports, and indoors for recess for one month.  Patients may go up and down stairs and lift up to five pounds but no bending, pushing or pulling.  Patients should do nothing called work or exercise until the follow up visit.  Patients should seek further activity limits at the time of their appointment.    APPOINTMENT  Please call our office for an appointment within five to ten days of discharge.  Any fever greater than 100.5, chills, nausea, vomiting, or severe diarrhea please call our office.  If the wound turns red, hot, swollen, becomes increasingly painful, or drains pus call us immediately at [phone].  For life threatening emergencies call 911.  For non-emergent care please call our office after 8:30 a.m. Monday through Friday.  The number listed above is our office number.  Our phone automatically switches to our answering service if we are not there.  Please do not use the emergency room for non-urgent care.    Thank you for entrusting us with your care.  [facility]--General Surgery    Your hemoglobin A1c level  came back as Last A1c value was 8.3% done 6/24/2024.  . If you are not already following a diabetic diet, it is recommended that you begin to do so. If you feel you need further help with managing your diet, an appointment with the diabetes learning center is recommended. The learning center can help you meal plan as well as find foods that work for you within your diet restrictions. Please let your doctor know if you are interested in the diabetes learning center. An MD consult will be needed.     Type 2 Diabetes Nutrition Therapy    Why Is Carbohydrate Counting Important?  Counting carbohydrate servings may help you control your blood glucose level so you feel better.  The balance between the carbohydrates you eat and insulin determines what your blood glucose level will be after eating.  Carbohydrate counting can also help you plan your meals.    Which Foods Have Carbohydrates?  Foods with carbohydrates include:     Breads, crackers, and cereals  Pasta, rice, and grains  Starchy vegetables, such as potatoes, corn, and peas  Beans and legumes  Milk, soy milk, and yogurt  Fruits and fruit juices  Sweets, such as cakes, cookies, ice cream, jam, and jelly    Carbohydrate Servings  In diabetes meal planning, 1 serving of a food with carbohydrate has about 15 grams of carbohydrate:  Check serving sizes with measuring cups and spoons or a food scale.  Read the Nutrition Facts on food labels to find out how many grams of carbohydrate are in foods you eat.  The food lists in this handout show portions that have about 15 grams of carbohydrate.    Meal Planning Tips  An Eating Plan tells you how many carbohydrate servings to eat at your meals and snacks. For many adults, eating 3 to 5 servings of carbohydrate foods at each meal and 1 or 2 carbohydrate servings for each snack works well.    ~In a healthy daily Eating Plan, most carbohydrates come from:  At least 6 servings of fruits and nonstarchy vegetables  At least 6  servings of grains, beans, and starchy vegetables, with at least 3 servings from whole grains  At least 2 servings of milk or milk products  ~Check your blood glucose level regularly. It can tell you if you need to adjust when you eat carbohydrates.  ~Eating foods that have fiber, such as whole grains, and having very few salty foods is good for your health.  ~Eat 4 to 6 ounces of meat or other protein foods (such as soybean burgers) each day. Choose low-fat sources of protein, such as lean beef, lean pork, chicken, fish, low-fat cheese, or vegetarian foods such as soy.  ~Eat some healthy fats, such as olive oil, canola oil, and nuts.  ~Eat very little saturated fats. These unhealthy fats are found in butter, cream, and high-fat meats, such as ha and sausage.  ~Eat very little or no trans fats. These unhealthy fats are found in all foods that list “partially hydrogenated oil” as an ingredient.    Label Reading Tips  The Nutrition Facts panel on a label lists the grams of total carbohydrate in 1 standard serving. The label’s standard serving may be larger or smaller than 1 carbohydrate serving.    To figure out how many carbohydrate servings are in the food:    First, look at the label’s standard serving size.  Check the grams of total carbohydrate. This is the amount of carbohydrate in 1 standard serving.  Divide the grams of total carbohydrate by 15. This number equals the number of carbohydrate servings in 1 standard serving. Remember: 1 carbohydrate serving is 15 grams of carbohydrate.  Note: You may ignore the grams of sugars on the Nutrition Facts panel because they are included in the grams of total carbohydrate.    Foods Recommended  1 serving = about 15 grams of carbohydrate    Grains   1 slice bread (1 ounce)  1 tortilla (6-inch size)  ¼ large bagel (1 ounce)  2 taco shells (5-inch size)  ½ hamburger or hot dog bun (¾ ounce)  ¾ cup ready-to-eat unsweetened cereal  ½ cup cooked cereal  1 cup broth-based  soup  4 to 6 small crackers  1/3 cup pasta or rice (cooked)  ½ cup beans, peas, corn, sweet potatoes, winter squash, or mashed or boiled potatoes (cooked)  ¼ large baked potato (3 ounces)  ¾ ounce pretzels, potato chips, or tortilla chips  3 cups popcorn (popped)    Fruit  1 small fresh fruit (¾ to 1 cup)  ½ cup canned or frozen fruit  2 tablespoons dried fruit (blueberries, cherries, cranberries, mixed fruit, raisins)  17 small grapes (3 ounces)  1 cup melon, berries  ½ cup unsweetened fruit juice    Milk  1 cup fat-free or reduced-fat milk  1 cup soy milk  2/3 cup (6 ounces) nonfat yogurt sweetened with sugar-free sweetener  Sweets and Desserts  2-inch square cake (unfrosted)  2 small cookies (2/3 ounce)  ½ cup ice cream or frozen yogurt  ¼ cup sherbet or sorbet  1 tablespoon syrup, jam, jelly, table sugar, or honey  2 tablespoons light syrup    Other Foods  Count 1 cup raw vegetables or ½ cup cooked nonstarchy vegetables as zero (0) carbohydrate servings or “free” foods. If you eat 3 or more servings at one meal, count them as 1 carbohydrate serving.  Foods that have less than 20 calories in each serving also may be counted as zero carbohydrate servings or “free” foods.  Count 1 cup of casserole or other mixed foods as 2 carbohydrate servings.      Type 2 Diabetes Sample 1-Day Menu   Breakfast  1 slice whole grain toast (1 carbohydrate serving)  1 teaspoon trans-fat free margarine  1 egg omelet  1 orange (1 carbohydrate serving)  6 ounces low-fat, plain Greek yogurt (1 carbohydrate serving)    Lunch  2 ounces turkey breast  2 slices whole grain bread (2 carbohydrate servings)  Lettuce and tomato salad  1 small apple (1 carbohydrate serving)  1 cup cucumber slices (0.5 carbohydrate serving)    Evening Meal  3 ounces baked chicken  1 cup baked, mashed sweet potato (2 carbohydrate serving)  1/2 cup cooked broccoli  1 large green salad  1 cup fat-free skim milk (1 carbohydrate serving)  1 cup fresh raspberries (1  carbohydrate serving)    Evening Snack  1 tablespoon nuts  1/2 cup ice cream (1 carbohydrate serving)  1-1/4 cups strawberries (1 carbohydrate serving)

## 2024-06-25 ENCOUNTER — ANESTHESIA EVENT (OUTPATIENT)
Dept: SURGERY | Facility: HOSPITAL | Age: 49
End: 2024-06-25
Payer: COMMERCIAL

## 2024-06-25 VITALS
BODY MASS INDEX: 38 KG/M2 | WEIGHT: 226.13 LBS | OXYGEN SATURATION: 93 % | DIASTOLIC BLOOD PRESSURE: 68 MMHG | RESPIRATION RATE: 16 BRPM | TEMPERATURE: 98 F | HEART RATE: 66 BPM | SYSTOLIC BLOOD PRESSURE: 154 MMHG

## 2024-06-25 LAB
ALBUMIN SERPL-MCNC: 2.6 G/DL (ref 3.4–5)
ALBUMIN/GLOB SERPL: 0.6 {RATIO} (ref 1–2)
ALP LIVER SERPL-CCNC: 86 U/L
ALT SERPL-CCNC: 19 U/L
ANION GAP SERPL CALC-SCNC: 6 MMOL/L (ref 0–18)
AST SERPL-CCNC: 15 U/L (ref 15–37)
BASOPHILS # BLD AUTO: 0.05 X10(3) UL (ref 0–0.2)
BASOPHILS NFR BLD AUTO: 0.3 %
BILIRUB SERPL-MCNC: 0.6 MG/DL (ref 0.1–2)
BUN BLD-MCNC: 9 MG/DL (ref 9–23)
CALCIUM BLD-MCNC: 8.4 MG/DL (ref 8.5–10.1)
CHLORIDE SERPL-SCNC: 101 MMOL/L (ref 98–112)
CO2 SERPL-SCNC: 27 MMOL/L (ref 21–32)
CREAT BLD-MCNC: 0.73 MG/DL
EGFRCR SERPLBLD CKD-EPI 2021: 100 ML/MIN/1.73M2 (ref 60–?)
EOSINOPHIL # BLD AUTO: 0.02 X10(3) UL (ref 0–0.7)
EOSINOPHIL NFR BLD AUTO: 0.1 %
ERYTHROCYTE [DISTWIDTH] IN BLOOD BY AUTOMATED COUNT: 12.5 %
GLOBULIN PLAS-MCNC: 4.1 G/DL (ref 2.8–4.4)
GLUCOSE BLD-MCNC: 238 MG/DL (ref 70–99)
GLUCOSE BLD-MCNC: 261 MG/DL (ref 70–99)
GLUCOSE BLD-MCNC: 269 MG/DL (ref 70–99)
GLUCOSE BLD-MCNC: 271 MG/DL (ref 70–99)
HCT VFR BLD AUTO: 39.5 %
HGB BLD-MCNC: 13.8 G/DL
IMM GRANULOCYTES # BLD AUTO: 0.24 X10(3) UL (ref 0–1)
IMM GRANULOCYTES NFR BLD: 1.5 %
LYMPHOCYTES # BLD AUTO: 0.73 X10(3) UL (ref 1–4)
LYMPHOCYTES NFR BLD AUTO: 4.5 %
MCH RBC QN AUTO: 30.1 PG (ref 26–34)
MCHC RBC AUTO-ENTMCNC: 34.9 G/DL (ref 31–37)
MCV RBC AUTO: 86.2 FL
MONOCYTES # BLD AUTO: 0.69 X10(3) UL (ref 0.1–1)
MONOCYTES NFR BLD AUTO: 4.2 %
NEUTROPHILS # BLD AUTO: 14.67 X10 (3) UL (ref 1.5–7.7)
NEUTROPHILS # BLD AUTO: 14.67 X10(3) UL (ref 1.5–7.7)
NEUTROPHILS NFR BLD AUTO: 89.4 %
OSMOLALITY SERPL CALC.SUM OF ELEC: 286 MOSM/KG (ref 275–295)
PLATELET # BLD AUTO: 293 10(3)UL (ref 150–450)
POTASSIUM SERPL-SCNC: 3.8 MMOL/L (ref 3.5–5.1)
PROT SERPL-MCNC: 6.7 G/DL (ref 6.4–8.2)
RBC # BLD AUTO: 4.58 X10(6)UL
SODIUM SERPL-SCNC: 134 MMOL/L (ref 136–145)
WBC # BLD AUTO: 16.4 X10(3) UL (ref 4–11)

## 2024-06-25 PROCEDURE — 99239 HOSP IP/OBS DSCHRG MGMT >30: CPT | Performed by: HOSPITALIST

## 2024-06-25 RX ORDER — LIDOCAINE HYDROCHLORIDE 10 MG/ML
INJECTION, SOLUTION EPIDURAL; INFILTRATION; INTRACAUDAL; PERINEURAL AS NEEDED
Status: DISCONTINUED | OUTPATIENT
Start: 2024-06-25 | End: 2024-06-25 | Stop reason: SURG

## 2024-06-25 RX ORDER — SODIUM CHLORIDE, SODIUM LACTATE, POTASSIUM CHLORIDE, CALCIUM CHLORIDE 600; 310; 30; 20 MG/100ML; MG/100ML; MG/100ML; MG/100ML
INJECTION, SOLUTION INTRAVENOUS CONTINUOUS PRN
Status: DISCONTINUED | OUTPATIENT
Start: 2024-06-25 | End: 2024-06-25 | Stop reason: SURG

## 2024-06-25 RX ORDER — BUPIVACAINE HYDROCHLORIDE AND EPINEPHRINE 5; 5 MG/ML; UG/ML
INJECTION, SOLUTION EPIDURAL; INTRACAUDAL; PERINEURAL AS NEEDED
Status: DISCONTINUED | OUTPATIENT
Start: 2024-06-25 | End: 2024-06-25 | Stop reason: HOSPADM

## 2024-06-25 RX ORDER — ONDANSETRON 2 MG/ML
4 INJECTION INTRAMUSCULAR; INTRAVENOUS EVERY 6 HOURS PRN
Status: DISCONTINUED | OUTPATIENT
Start: 2024-06-25 | End: 2024-06-25 | Stop reason: HOSPADM

## 2024-06-25 RX ORDER — AMOXICILLIN AND CLAVULANATE POTASSIUM 875; 125 MG/1; MG/1
1 TABLET, FILM COATED ORAL 2 TIMES DAILY
Qty: 10 TABLET | Refills: 0 | Status: SHIPPED | OUTPATIENT
Start: 2024-06-25 | End: 2024-06-30

## 2024-06-25 RX ORDER — PROCHLORPERAZINE EDISYLATE 5 MG/ML
5 INJECTION INTRAMUSCULAR; INTRAVENOUS EVERY 8 HOURS PRN
Status: DISCONTINUED | OUTPATIENT
Start: 2024-06-25 | End: 2024-06-25 | Stop reason: HOSPADM

## 2024-06-25 RX ORDER — METRONIDAZOLE 500 MG/100ML
INJECTION, SOLUTION INTRAVENOUS AS NEEDED
Status: DISCONTINUED | OUTPATIENT
Start: 2024-06-25 | End: 2024-06-25 | Stop reason: SURG

## 2024-06-25 RX ORDER — DIPHENHYDRAMINE HYDROCHLORIDE 50 MG/ML
12.5 INJECTION INTRAMUSCULAR; INTRAVENOUS AS NEEDED
Status: DISCONTINUED | OUTPATIENT
Start: 2024-06-25 | End: 2024-06-25 | Stop reason: HOSPADM

## 2024-06-25 RX ORDER — HYDROMORPHONE HYDROCHLORIDE 1 MG/ML
0.2 INJECTION, SOLUTION INTRAMUSCULAR; INTRAVENOUS; SUBCUTANEOUS EVERY 5 MIN PRN
Status: DISCONTINUED | OUTPATIENT
Start: 2024-06-25 | End: 2024-06-25 | Stop reason: HOSPADM

## 2024-06-25 RX ORDER — ACETAMINOPHEN 10 MG/ML
1000 INJECTION, SOLUTION INTRAVENOUS ONCE AS NEEDED
Status: DISCONTINUED | OUTPATIENT
Start: 2024-06-25 | End: 2024-06-25 | Stop reason: HOSPADM

## 2024-06-25 RX ORDER — OXYCODONE HYDROCHLORIDE 5 MG/1
5 TABLET ORAL EVERY 4 HOURS PRN
Status: DISCONTINUED | OUTPATIENT
Start: 2024-06-25 | End: 2024-06-25

## 2024-06-25 RX ORDER — HYDROCODONE BITARTRATE AND ACETAMINOPHEN 5; 325 MG/1; MG/1
1 TABLET ORAL EVERY 6 HOURS PRN
Qty: 15 TABLET | Refills: 0 | Status: SHIPPED | OUTPATIENT
Start: 2024-06-25

## 2024-06-25 RX ORDER — MEPERIDINE HYDROCHLORIDE 25 MG/ML
12.5 INJECTION INTRAMUSCULAR; INTRAVENOUS; SUBCUTANEOUS AS NEEDED
Status: DISCONTINUED | OUTPATIENT
Start: 2024-06-25 | End: 2024-06-25 | Stop reason: HOSPADM

## 2024-06-25 RX ORDER — HYDROMORPHONE HYDROCHLORIDE 1 MG/ML
0.4 INJECTION, SOLUTION INTRAMUSCULAR; INTRAVENOUS; SUBCUTANEOUS EVERY 5 MIN PRN
Status: DISCONTINUED | OUTPATIENT
Start: 2024-06-25 | End: 2024-06-25 | Stop reason: HOSPADM

## 2024-06-25 RX ORDER — NALOXONE HYDROCHLORIDE 0.4 MG/ML
0.08 INJECTION, SOLUTION INTRAMUSCULAR; INTRAVENOUS; SUBCUTANEOUS AS NEEDED
Status: DISCONTINUED | OUTPATIENT
Start: 2024-06-25 | End: 2024-06-25 | Stop reason: HOSPADM

## 2024-06-25 RX ORDER — FAMOTIDINE 20 MG/1
20 TABLET, FILM COATED ORAL 2 TIMES DAILY
Status: DISCONTINUED | OUTPATIENT
Start: 2024-06-25 | End: 2024-06-25

## 2024-06-25 RX ORDER — CEFAZOLIN SODIUM 1 G/3ML
INJECTION, POWDER, FOR SOLUTION INTRAMUSCULAR; INTRAVENOUS AS NEEDED
Status: DISCONTINUED | OUTPATIENT
Start: 2024-06-25 | End: 2024-06-25 | Stop reason: SURG

## 2024-06-25 RX ORDER — ONDANSETRON 2 MG/ML
INJECTION INTRAMUSCULAR; INTRAVENOUS AS NEEDED
Status: DISCONTINUED | OUTPATIENT
Start: 2024-06-25 | End: 2024-06-25 | Stop reason: SURG

## 2024-06-25 RX ORDER — SODIUM CHLORIDE, SODIUM LACTATE, POTASSIUM CHLORIDE, CALCIUM CHLORIDE 600; 310; 30; 20 MG/100ML; MG/100ML; MG/100ML; MG/100ML
INJECTION, SOLUTION INTRAVENOUS CONTINUOUS
Status: DISCONTINUED | OUTPATIENT
Start: 2024-06-25 | End: 2024-06-25 | Stop reason: HOSPADM

## 2024-06-25 RX ORDER — ROCURONIUM BROMIDE 10 MG/ML
INJECTION, SOLUTION INTRAVENOUS AS NEEDED
Status: DISCONTINUED | OUTPATIENT
Start: 2024-06-25 | End: 2024-06-25 | Stop reason: SURG

## 2024-06-25 RX ORDER — HYDROMORPHONE HYDROCHLORIDE 1 MG/ML
INJECTION, SOLUTION INTRAMUSCULAR; INTRAVENOUS; SUBCUTANEOUS
Status: COMPLETED
Start: 2024-06-25 | End: 2024-06-25

## 2024-06-25 RX ORDER — SODIUM CHLORIDE, SODIUM LACTATE, POTASSIUM CHLORIDE, CALCIUM CHLORIDE 600; 310; 30; 20 MG/100ML; MG/100ML; MG/100ML; MG/100ML
INJECTION, SOLUTION INTRAVENOUS CONTINUOUS
Status: DISCONTINUED | OUTPATIENT
Start: 2024-06-25 | End: 2024-06-25

## 2024-06-25 RX ORDER — MIDAZOLAM HYDROCHLORIDE 1 MG/ML
1 INJECTION INTRAMUSCULAR; INTRAVENOUS EVERY 5 MIN PRN
Status: DISCONTINUED | OUTPATIENT
Start: 2024-06-25 | End: 2024-06-25 | Stop reason: HOSPADM

## 2024-06-25 RX ORDER — HYDROMORPHONE HYDROCHLORIDE 1 MG/ML
0.4 INJECTION, SOLUTION INTRAMUSCULAR; INTRAVENOUS; SUBCUTANEOUS EVERY 2 HOUR PRN
Status: DISCONTINUED | OUTPATIENT
Start: 2024-06-25 | End: 2024-06-25

## 2024-06-25 RX ORDER — ACETAMINOPHEN 500 MG
1000 TABLET ORAL EVERY 8 HOURS
Status: DISCONTINUED | OUTPATIENT
Start: 2024-06-25 | End: 2024-06-25

## 2024-06-25 RX ORDER — HYDROMORPHONE HYDROCHLORIDE 1 MG/ML
0.2 INJECTION, SOLUTION INTRAMUSCULAR; INTRAVENOUS; SUBCUTANEOUS EVERY 2 HOUR PRN
Status: DISCONTINUED | OUTPATIENT
Start: 2024-06-25 | End: 2024-06-25

## 2024-06-25 RX ORDER — ACETAMINOPHEN 325 MG/1
650 TABLET ORAL
Status: DISCONTINUED | OUTPATIENT
Start: 2024-06-25 | End: 2024-06-25

## 2024-06-25 RX ORDER — HEPARIN SODIUM 5000 [USP'U]/ML
5000 INJECTION, SOLUTION INTRAVENOUS; SUBCUTANEOUS EVERY 12 HOURS SCHEDULED
Status: DISCONTINUED | OUTPATIENT
Start: 2024-06-25 | End: 2024-06-25

## 2024-06-25 RX ORDER — HYDROMORPHONE HYDROCHLORIDE 1 MG/ML
0.6 INJECTION, SOLUTION INTRAMUSCULAR; INTRAVENOUS; SUBCUTANEOUS EVERY 5 MIN PRN
Status: DISCONTINUED | OUTPATIENT
Start: 2024-06-25 | End: 2024-06-25 | Stop reason: HOSPADM

## 2024-06-25 RX ORDER — MELATONIN
3 NIGHTLY PRN
Status: DISCONTINUED | OUTPATIENT
Start: 2024-06-25 | End: 2024-06-25

## 2024-06-25 RX ORDER — FAMOTIDINE 10 MG/ML
20 INJECTION, SOLUTION INTRAVENOUS 2 TIMES DAILY
Status: DISCONTINUED | OUTPATIENT
Start: 2024-06-25 | End: 2024-06-25

## 2024-06-25 RX ORDER — DEXAMETHASONE SODIUM PHOSPHATE 4 MG/ML
VIAL (ML) INJECTION AS NEEDED
Status: DISCONTINUED | OUTPATIENT
Start: 2024-06-25 | End: 2024-06-25 | Stop reason: SURG

## 2024-06-25 RX ADMIN — DEXAMETHASONE SODIUM PHOSPHATE 8 MG: 4 MG/ML VIAL (ML) INJECTION at 00:33:00

## 2024-06-25 RX ADMIN — SODIUM CHLORIDE, SODIUM LACTATE, POTASSIUM CHLORIDE, CALCIUM CHLORIDE: 600; 310; 30; 20 INJECTION, SOLUTION INTRAVENOUS at 00:26:00

## 2024-06-25 RX ADMIN — CEFAZOLIN SODIUM 2 G: 1 INJECTION, POWDER, FOR SOLUTION INTRAMUSCULAR; INTRAVENOUS at 00:38:00

## 2024-06-25 RX ADMIN — ONDANSETRON 4 MG: 2 INJECTION INTRAMUSCULAR; INTRAVENOUS at 00:33:00

## 2024-06-25 RX ADMIN — SODIUM CHLORIDE, SODIUM LACTATE, POTASSIUM CHLORIDE, CALCIUM CHLORIDE: 600; 310; 30; 20 INJECTION, SOLUTION INTRAVENOUS at 01:48:00

## 2024-06-25 RX ADMIN — LIDOCAINE HYDROCHLORIDE 100 MG: 10 INJECTION, SOLUTION EPIDURAL; INFILTRATION; INTRACAUDAL; PERINEURAL at 00:33:00

## 2024-06-25 RX ADMIN — SODIUM CHLORIDE, SODIUM LACTATE, POTASSIUM CHLORIDE, CALCIUM CHLORIDE: 600; 310; 30; 20 INJECTION, SOLUTION INTRAVENOUS at 02:19:00

## 2024-06-25 RX ADMIN — METRONIDAZOLE 500 MG: 500 INJECTION, SOLUTION INTRAVENOUS at 00:46:00

## 2024-06-25 RX ADMIN — ROCURONIUM BROMIDE 50 MG: 10 INJECTION, SOLUTION INTRAVENOUS at 00:33:00

## 2024-06-25 NOTE — ANESTHESIA PREPROCEDURE EVALUATION
PRE-OP EVALUATION    Patient Name: Camila Rayo    Admit Diagnosis: Acute appendicitis  Appendicitis, acute    Pre-op Diagnosis: Acute appendicitis [K35.80]    LAPAROSCOPIC APPENDECTOMY, POSSIBLE OPEN    Anesthesia Procedure: LAPAROSCOPIC APPENDECTOMY, POSSIBLE OPEN (Abdomen)    Surgeons and Role:     * Rea Murcia MD - Primary    Pre-op vitals reviewed.  Temp: 99.6 °F (37.6 °C)  Pulse: 79  Resp: 19  BP: 125/63  SpO2: 93 %  Body mass index is 37.63 kg/m².    Current medications reviewed.  Hospital Medications:   [COMPLETED] iopamidol 76% (ISOVUE-370) injection for power injector  100 mL Intravenous ONCE PRN    [Transfer Hold] insulin aspart (NovoLOG) 100 Units/mL FlexPen 1-68 Units  1-68 Units Subcutaneous TID CC    [Transfer Hold] glucose (Dex4) 15 GM/59ML oral liquid 15 g  15 g Oral Q15 Min PRN    Or    [Transfer Hold] glucose (Glutose) 40% oral gel 15 g  15 g Oral Q15 Min PRN    Or    [Transfer Hold] glucose-vitamin C (Dex-4) chewable tab 4 tablet  4 tablet Oral Q15 Min PRN    Or    [Transfer Hold] dextrose 50% injection 50 mL  50 mL Intravenous Q15 Min PRN    Or    [Transfer Hold] glucose (Dex4) 15 GM/59ML oral liquid 30 g  30 g Oral Q15 Min PRN    Or    [Transfer Hold] glucose (Glutose) 40% oral gel 30 g  30 g Oral Q15 Min PRN    Or    [Transfer Hold] glucose-vitamin C (Dex-4) chewable tab 8 tablet  8 tablet Oral Q15 Min PRN    [Transfer Hold] insulin aspart (NovoLOG) 100 Units/mL FlexPen 1-30 Units  1-30 Units Subcutaneous TID AC and HS    [Transfer Hold] acetaminophen (Tylenol Extra Strength) tab 500 mg  500 mg Oral Q4H PRN    [Transfer Hold] melatonin tab 3 mg  3 mg Oral Nightly PRN    [Transfer Hold] guaiFENesin ER (Mucinex) 12 hr tab 600 mg  600 mg Oral BID PRN    [Transfer Hold] benzonatate (Tessalon) cap 200 mg  200 mg Oral TID PRN    [Transfer Hold] glycerin-hypromellose- (Artificial Tears) 0.2-0.2-1 % ophthalmic solution 1 drop  1 drop Both Eyes QID PRN    [Transfer Hold] sodium  chloride (Saline Mist) 0.65 % nasal solution 1 spray  1 spray Each Nare Q3H PRN    sodium chloride 0.9% infusion   Intravenous Continuous    [Transfer Hold] ondansetron (Zofran) 4 MG/2ML injection 4 mg  4 mg Intravenous Q6H PRN    [Transfer Hold] prochlorperazine (Compazine) 10 MG/2ML injection 5 mg  5 mg Intravenous Q8H PRN    [Transfer Hold] polyethylene glycol (PEG 3350) (Miralax) 17 g oral packet 17 g  17 g Oral Daily PRN    [Transfer Hold] sennosides (Senokot) tab 17.2 mg  17.2 mg Oral Nightly PRN    [Transfer Hold] bisacodyl (Dulcolax) 10 MG rectal suppository 10 mg  10 mg Rectal Daily PRN    [Transfer Hold] fleet enema (Fleet) 7-19 GM/118ML rectal enema 133 mL  1 enema Rectal Once PRN    [Transfer Hold] morphINE PF 2 MG/ML injection 1 mg  1 mg Intravenous Q2H PRN    Or    [Transfer Hold] morphINE PF 2 MG/ML injection 2 mg  2 mg Intravenous Q2H PRN    Or    [Transfer Hold] morphINE PF 4 MG/ML injection 4 mg  4 mg Intravenous Q2H PRN    [Transfer Hold] hydrALAzine (Apresoline) 20 mg/mL injection 5 mg  5 mg Intravenous Q4H PRN    [Transfer Hold] labetalol (Trandate) 5 mg/mL injection 10 mg  10 mg Intravenous Q4H PRN       Outpatient Medications:     Medications Prior to Admission   Medication Sig Dispense Refill Last Dose    Citalopram Hydrobromide (CELEXA) 40 MG Oral Tab Take 1 tablet by mouth daily. 30 tablet 6 6/21/2024    amLODIPine Besylate 5 MG Oral Tab Take 5 mg by mouth daily. (Patient not taking: Reported on 6/24/2024)       metFORMIN HCl  MG Oral Tablet 24 Hr Take 1 tablet (500 mg total) by mouth daily.   6/21/2024    Clobetasol Propionate 0.05 % External Ointment Use on AA BID for 1-2 weeks 50 g 1     Cholecalciferol (VITAMIN D3) 2000 units Oral Tab Take 1 tablet (2,000 Units total) by mouth every 7 days. On Saturday 6/21/2024       Allergies: Wellbutrin [bupropion]      Anesthesia Evaluation    Patient summary reviewed.    Anesthetic Complications           GI/Hepatic/Renal                                  Cardiovascular                (+) obesity                                       Endo/Other      (+) diabetes  type 2, not using insulin                         Pulmonary                           Neuro/Psych                                      Past Surgical History:   Procedure Laterality Date          x2     Social History     Socioeconomic History    Marital status:     Number of children: 1   Occupational History    Occupation: a/r representative   Tobacco Use    Smoking status: Every Day     Types: Cigarettes    Smokeless tobacco: Never    Tobacco comments:     1/2ppd   Vaping Use    Vaping status: Never Used   Substance and Sexual Activity    Alcohol use: Yes     Comment: 1 dk/yr    Drug use: No    Sexual activity: Not Currently     Partners: Male     Comment: single     History   Drug Use No     Available pre-op labs reviewed.  Lab Results   Component Value Date    WBC 16.4 (H) 2024    RBC 5.21 2024    HGB 15.7 2024    HCT 44.5 2024    MCV 86.8 2024    MCV 85.4 2024    MCH 30.1 2024    MCHC 34.1 2024    MCHC 35.3 2024    RDW 12.6 2024    .0 2024               Airway      Mallampati: II  Mouth opening: >3 FB  TM distance: > 6 cm  Neck ROM: full Cardiovascular    Cardiovascular exam normal.         Dental             Pulmonary    Pulmonary exam normal.                 Other findings              ASA: 2   Plan: general  NPO status verified and           Plan/risks discussed with: patient                Present on Admission:  **None**

## 2024-06-25 NOTE — PLAN OF CARE
Problem: Patient/Family Goals  Goal: Patient/Family Long Term Goal  Description: Patient's Long Term Goal: Discharge    Interventions:  - Pain management, Tolerating diet, Return of ADLs  - See additional Care Plan goals for specific interventions  Outcome: Progressing  Goal: Patient/Family Short Term Goal  Description: Patient's Short Term Goal: comfort care    Interventions:   - cluster care, manage per MAR, transition from IV to PO, educate non-pharmacological pain management, frequent rounding  - See additional Care Plan goals for specific interventions  Outcome: Progressing     Problem: PAIN - ADULT  Goal: Verbalizes/displays adequate comfort level or patient's stated pain goal  Description: INTERVENTIONS:  - Encourage pt to monitor pain and request assistance  - Assess pain using appropriate pain scale  - Administer analgesics based on type and severity of pain and evaluate response  - Implement non-pharmacological measures as appropriate and evaluate response  - Consider cultural and social influences on pain and pain management  - Manage/alleviate anxiety  - Utilize distraction and/or relaxation techniques  - Monitor for opioid side effects  - Notify MD/LIP if interventions unsuccessful or patient reports new pain  - Anticipate increased pain with activity and pre-medicate as appropriate  Outcome: Progressing     Problem: RISK FOR INFECTION - ADULT  Goal: Absence of fever/infection during anticipated neutropenic period  Description: INTERVENTIONS  - Monitor WBC  - Administer growth factors as ordered  - Implement neutropenic guidelines  Outcome: Progressing     Problem: SAFETY ADULT - FALL  Goal: Free from fall injury  Description: INTERVENTIONS:  - Assess pt frequently for physical needs  - Identify cognitive and physical deficits and behaviors that affect risk of falls.  - [place] fall precautions as indicated by assessment.  - Educate pt/family on patient safety including physical limitations  -  Instruct pt to call for assistance with activity based on assessment  - Modify environment to reduce risk of injury  - Provide assistive devices as appropriate  - Consider OT/PT consult to assist with strengthening/mobility  - Encourage toileting schedule  Outcome: Progressing     Problem: DISCHARGE PLANNING  Goal: Discharge to home or other facility with appropriate resources  Description: INTERVENTIONS:  - Identify barriers to discharge w/pt and caregiver  - Include patient/family/discharge partner in discharge planning  - Arrange for needed discharge resources and transportation as appropriate  - Identify discharge learning needs (meds, wound care, etc)  - Arrange for interpreters to assist at discharge as needed  - Consider post-discharge preferences of patient/family/discharge partner  - Complete POLST form as appropriate  - Assess patient's ability to be responsible for managing their own health  - Refer to Case Management Department for coordinating discharge planning if the patient needs post-hospital services based on physician/LIP order or complex needs related to functional status, cognitive ability or social support system  Outcome: Progressing     Problem: SKIN INTEGRITY  Goal: Skin integrity remains intact  Description: Interventions:  - Assess for areas of redness and/or skin breakdown  - Assess vascular sites hourly  - Change oxygen saturation probe minimum once per shift  - Provide humidity as ordered and per policy  - If on oxygen support, assess nasal septum area for skin breakdown and replace protective barrier if needed  - Change diapers as needed  - Minimize the use of adhesives  Outcome: Progressing  Goal: Incision/wound heals without complications  Description: Interventions:  - Assess wound bed/incision and surrounding skin tissue  - Collaborate with physician/APN and implement wound/incision site care and dressing changes as ordered  - Position infant to avoid placing pressure on wound  -  Enterostomal/Wound therapy consult as indicated for ostomies/wounds/G-tubes  Outcome: Progressing      ambulatory

## 2024-06-25 NOTE — ANESTHESIA POSTPROCEDURE EVALUATION
[facility]    Camila Rayo Patient Status:  Observation   Age/Gender 49 year old female MRN [id number]   Location [facility] 3NW-A Attending Ramirez Hutchinson DO   Hosp Day # 0 PCP No primary care provider on file.       Anesthesia Post-op Note    LAPAROSCOPIC APPENDECTOMY    Procedure Summary       Date: 06/25/24 Room / Location:  MAIN OR 08 / [facility] MAIN OR    Anesthesia Start: 0026 Anesthesia Stop: 0239    Procedure: LAPAROSCOPIC APPENDECTOMY (Abdomen) Diagnosis:       Acute appendicitis      (Acute appendicitis with localized peritonitis and abscess, no perforation)    Surgeons: Rea Murcia MD Responsible Provider: Angel Luis Patrick MD    Anesthesia Type: general ASA Status: 2            Anesthesia Type: general    Vitals Value Taken Time   /80 06/25/24 0811   Temp 98.5 °F (36.9 °C) 06/25/24 0809   Pulse 67 06/25/24 0858   Resp 16 06/25/24 0809   SpO2 94 % 06/25/24 0858   Vitals shown include unfiled device data.    Patient Location: PACU    Anesthesia Type: general    Airway Patency: extubated    Postop Pain Control: adequate    Mental Status: preanesthetic baseline    Nausea/Vomiting: none    Cardiopulmonary/Hydration status: stable euvolemic    Complications: no apparent anesthesia related complications    Postop vital signs: stable    Dental Exam: Unchanged from Preop

## 2024-06-25 NOTE — ANESTHESIA PROCEDURE NOTES
Airway  Date/Time: 6/25/2024 12:35 AM  Urgency: elective      General Information and Staff    Patient location during procedure: OR  Anesthesiologist: Angel Luis Patrick MD  Performed: anesthesiologist   Performed by: Angel Luis Patrick MD  Authorized by: Angel Luis Patrick MD      Indications and Patient Condition  Indications for airway management: anesthesia  Sedation level: deep  Preoxygenated: yes  Patient position: sniffing  Mask difficulty assessment: 1 - vent by mask    Final Airway Details  Final airway type: endotracheal airway      Successful airway: ETT  Cuffed: yes   Successful intubation technique: direct laryngoscopy  Endotracheal tube insertion site: oral  Blade: Mack  Blade size: #2  ETT size (mm): 7.0    Cormack-Lehane Classification: grade I - full view of glottis  Placement verified by: capnometry   Measured from: lips  ETT to lips (cm): 21  Number of attempts at approach: 1

## 2024-06-25 NOTE — PLAN OF CARE
Patients IV d/c'd, catheter intact. All discharge instructions explained, all questions answered. JASWINDER drain care explained, supplies given. Patient will be discharged via wheelchair with support staff.

## 2024-06-25 NOTE — PROGRESS NOTES
Pt returned from PACU in stable condition. VSS, Aox4. Pt now on room air. JASWINDER drain 60 ml SS in PACU, 50 ml since then. Pt pain minimal. IV fluids continued. ABX started. 3 lap sites c/d/I. POC discussed with pt and her daughter.

## 2024-06-25 NOTE — PLAN OF CARE
Pt VSS, Aox4. Current low grade temp 99.6. Pt up ad mariano. Pain reported 3/10, tylenol for management. Pt reports constipation & bloating; reports last BM Thursday 6/20/24. Given senna x1. Pt otherwise remains strict NPO in preparation for sx scheduled at 2350 pm tonight.  Pt abdomen rounded, mild firmness in lower abdomen noted. Bowel sounds present. Pt does report passing flatus. Pt on room air; denies CALLIE/SOB/Lightheadedness. Fall & safety precautions in place. POC discussed.

## 2024-06-25 NOTE — DISCHARGE SUMMARY
[facility]     Camila Rayo Patient Status:  Observation    1975 MRN [id number]   Location [facility] 3NW-A Attending Ramirez Hutchinson DO   Hosp Day # 0 PCP No primary care provider on file.     Date of Admission: 2024  Date of Discharge:   2024    Discharge Disposition: Home or Self Care    Discharge Diagnosis:  #Acute appendicitis  #Possible periappendiceal abscesses  #Diabetes type 2  #Hypertension  #Anxiety  #Depression      History of Present Illness: Camila Rayo is a 49 year old female with past medical history of diabetes, hypertension, depression, anxiety, ADHD, obesity who presents ED for abdominal pain.  Patient has had 4 days of generalized abdominal pain and bloating.  She has not passed gas over the past 4 days.  She had a few episodes of vomiting.  She thinks her last bowel meant was about a week ago.     Brief Synopsis: Patient admitted for abdominal pain.  Found to have acute appendicitis.  S/p laparoscopic appendectomy on .  Patient doing well postoperatively.    Lace+ Score: 29  59-90 High Risk  29-58 Medium Risk  0-28   Low Risk       TCM Follow-Up Recommendation:  LACE 29-58: Moderate Risk of readmission after discharge from the hospital.      Procedures during hospitalization:   S/p lap appendectomy to      Incidental or significant findings and recommendations (brief descriptions):      Lab/Test results pending at Discharge:       Consultants:  Gen sx    Discharge Medication List:     Discharge Medications        START taking these medications        Instructions Prescription details   amoxicillin clavulanate 875-125 MG Tabs  Commonly known as: Augmentin      Take 1 tablet by mouth 2 (two) times daily for 5 days.   Stop taking on: 2024  Quantity: 10 tablet  Refills: 0     HYDROcodone-acetaminophen 5-325 MG Tabs  Commonly known as: Norco      Take 1 tablet by mouth every 6 (six) hours as needed.   Quantity: 15  tablet  Refills: 0            CONTINUE taking these medications        Instructions Prescription details   cholecalciferol 50 MCG (2000 UT) Tabs  Commonly known as: Vitamin D3      Take 1 tablet (2,000 Units total) by mouth every 7 days. On Saturday   Refills: 0     citalopram 40 MG Tabs  Commonly known as: CeleXA      Take 1 tablet by mouth daily.   Quantity: 30 tablet  Refills: 6     clobetasol 0.05 % Oint  Commonly known as: Temovate      Use on AA BID for 1-2 weeks   Quantity: 50 g  Refills: 1     metFORMIN  MG Tb24  Commonly known as: Glucophage XR      Take 1 tablet (500 mg total) by mouth daily.   Refills: 0            ASK your doctor about these medications        Instructions Prescription details   amLODIPine 5 MG Tabs  Commonly known as: Norvasc      Take 5 mg by mouth daily.   Refills: 0               Where to Get Your Medications        These medications were sent to [id number] - [place], [place] - [address] AT [facility] [address], [phone], [phone]  [address] [facility] [address]      Phone: [phone]   amoxicillin clavulanate 875-125 MG Tabs  HYDROcodone-acetaminophen 5-325 MG Tabs         [place] reviewed:     Follow-up appointment:   [facility]  [address] [facility] [address] [phone]    Schedule an appointment as soon as possible for a visit in 2 week(s)      Rea Murcia MD  [address] [facility] [address] [phone]    Schedule an appointment as soon as possible for a visit  As needed    Appointments for Next 30 Days 6/25/2024 - 7/25/2024      None            Vital signs:  Temp:  [96.7 °F (35.9 °C)-100.4 °F (38 °C)] 98.5 °F (36.9 °C)  Pulse:  [] 72  Resp:  [11-21] 16  BP: (110-149)/(48-83) 149/80  SpO2:  [83 %-99 %] 83 %    Physical Exam:    General: No acute distress   Lungs: clear to auscultation  Cardiovascular: S1, S2  Abdomen:  Soft      -----------------------------------------------------------------------------------------------  PATIENT DISCHARGE INSTRUCTIONS: See electronic chart    Ramirez Hutchinson,     Total time spent on discharge plannin minutes     The  [employer] Cures Act makes medical notes like these available to patients in the interest of transparency. Please be advised this is a medical document. Medical documents are intended to carry relevant information, facts as evident, and the clinical opinion of the practitioner. The medical note is intended as peer to peer communication and may appear blunt or direct. It is written in medical language and may contain abbreviations or verbiage that are unfamiliar.

## 2024-06-25 NOTE — PROGRESS NOTES
[facility]  Progress Note    Camila Rayo Patient Status:  Observation    1975 MRN [id number]   Location [facility] 3NW-A Attending Ramirez Hutchinson DO   Hosp Day # 0 PCP No primary care provider on file.     Subjective:  The patient is doing well today.  She denies new complaints.  She denies fever or chills.  She reports right-sided abdominal incisional tenderness.  She denies nausea or vomiting.    Objective/Physical Exam:  General: Alert, orientated x3.  Cooperative.  No apparent distress.  Vital Signs:  Blood pressure 126/71, pulse 80, temperature 97.4 °F (36.3 °C), temperature source Axillary, resp. rate 16, weight 226 lb 1.6 oz (102.6 kg), SpO2 97%.  Wt Readings from Last 3 Encounters:   24 226 lb 1.6 oz (102.6 kg)   24 215 lb (97.5 kg)   20 250 lb (113.4 kg)     Lungs: No respiratory distress.  Cardiac: Regular rate and rhythm.   Abdomen:  Soft, non distended, incisional tenderness, with no rebound or guarding.  No peritoneal signs.   Extremities:  No lower extremity edema noted.    Incision: Clean, dry, intact, no erythema      Intake/Output:    Intake/Output Summary (Last 24 hours) at 2024 0800  Last data filed at 2024 0325  Gross per 24 hour   Intake 2000 ml   Output 405 ml   Net 1595 ml     I/O last 3 completed shifts:  In: 2000 [I.V.:2000]  Out: 405 [Urine:275; Drains:110; Blood:20]  No intake/output data recorded.    Medications:    famotidine  20 mg Oral BID    Or    famotidine  20 mg Intravenous BID    heparin  5,000 Units Subcutaneous 2 times per day    piperacillin-tazobactam  3.375 g Intravenous Q8H    acetaminophen  1,000 mg Oral Q8H    insulin aspart  1-68 Units Subcutaneous TID CC    insulin aspart  1-30 Units Subcutaneous TID AC and HS       Labs:  Lab Results   Component Value Date    WBC 16.4 2024    HGB 13.8 2024    HCT 39.5 2024    .0 2024     Lab Results   Component Value Date     2024    K 3.8  06/25/2024     06/25/2024    CO2 27.0 06/25/2024    BUN 9 06/25/2024    CREATSERUM 0.73 06/25/2024     06/25/2024    CA 8.4 06/25/2024    ALKPHO 86 06/25/2024    ALT 19 06/25/2024    AST 15 06/25/2024    BILT 0.6 06/25/2024    ALB 2.6 06/25/2024    TP 6.7 06/25/2024     No results found for: \"PT\", \"INR\"      Assessment  Patient Active Problem List   Diagnosis    Anxiety    Tobacco use    Obesity (BMI 30-39.9)    ADD (attention deficit disorder)    Elbow strain, right, initial encounter    DEPRESSION    Appendicitis, acute    Leukocytosis    Adrenal mass (HCC)       POD 1 laparoscopic appendectomy  Plan:  Continue diet as tolerated.  Analgesics as needed.  Discharge to home with oral antibiotics x 5 days  Discharge instructions were reviewed with the patient.  The patient is stable for discharge to home today.  Follow-up with [facility] for drain removal, sooner if needed.      Quality:  DVT Mechanical Prophylaxis:   SCDs, Early ambuation  DVT Pharmacologic Prophylaxis   Medication    heparin (Porcine) 5000 UNIT/ML injection 5,000 Units                Code Status: Not on file  Vega: No urinary catheter in place  Vega Duration (in days):   Central line:    TRISH: 6/25/2024        Riya Michelle PA-C  6/25/2024  8:00 AM    POD1 status post laparoscopic appendectomy and drain placement for severe acute appendicitis with abscess formation.  Overall the patient is feeling well.  She is tolerating diet.  Patient will discharge home today with oral antibiotics and pain medication.  We again discussed the CT incidental CT scan findings of bilateral adrenal nodules.  The patient's PCP is Dr. Maia Santana and she will follow-up with Dr. Dinero  for further imaging and management.  Discharge instructions were reviewed.  The patient has no questions at this time.  She will follow-up in office for drain removal once output has decreased.

## 2024-06-25 NOTE — OPERATIVE REPORT
[facility]  Operative Note    Camila Rayo Location: OR   CSN [id number] MRN [id number]   Admission Date 6/24/2024 Operation Date 6/25/2024   Attending Physician Jovani Story DO Operating Physician Rea Murcia MD     Date of procedure:   June 25, 2024    Pre-Operative Diagnosis: Acute appendicitis with localized peritonitis and abscess, no perforation    Indication for Surgery: Acute appendicitis    Post-Operative Diagnosis: Same as above     Procedure Performed: Laparoscopic appendectomy    Surgeons and Role:     * Rea Murcia MD - Primary    Anesthesia: General    History of Present Illness:         49-year-old female who presents to the emergency department with complaints of abdominal pain.  Workup in the emergency department revealed a leukocytosis of 16.4.  CT scan abdomen pelvis revealed bilateral adrenal nodules measuring 2.6 cm on the right and 1.7 cm on the left.  Small umbilical fat-containing hernia and fat-containing right inguinal hernia is also noted.  The appendix is noted to be dilated to 12 mm with mucosal enhancement and periappendiceal inflammatory changes consistent with acute appendicitis.  Focal fluid collection measuring up to 1.8 cm adjacent to the appendix is noted.  Findings are consistent with acute appendicitis.  Perforation and abscess cannot be excluded.  Treatment options were reviewed in detail with Camila.  At this time the recommendation is to proceed with laparoscopic appendectomy.  Possible need for placement of drain, possible conversion to open appendectomy given CT scan findings and possibility of perforation was discussed in detail with the patient.  She has no further questions at this time and will proceed with surgery today as discussed.  Additionally we did discuss the presence of bilateral adrenal nodules.  The patient will follow-up with her PCP for further evaluation and management.    Discussed with patient:  The potential treatment options, risks and  benefits of the procedure were discussed in detail with the patient including but not limited to bleeding, infection, seroma/ hematoma formation, postoperative wound complications including development of a hernia, possible trocar injury and injury to other internal organs, possible removal of a normal appendix, possible need for a drain, possible conversion to open appendectomy.  These and other potential intraoperative and perioperative risks including but not limited to thromboembolic events were reviewed.  The patient states understanding and does not have any further questions at this time and does wish to proceed with surgery.    Summary of case: The patient was taken to the operating room and was placed on the OR table in the supine position. After the induction of general anesthesia perioperative antibiotics were given. The patient was prepped and draped in usual sterile fashion. Using local anesthesia a myra-umbilical incision was made and the anterior abdominal fascia was elevated with a Helena forcep.  The Veress needle was introduced into the abdomen and the abdomen was insufflated to 15 mmHg.  The Veress needle was exchanged for a 11 mm port. Under direct vision 2 accessory ports were placed without incidence. The patient was turned into a Trendelenburg position with the right side up.    The appendix was not immediately visible.  There was a large inflammatory mass in the right lower quadrant.  The loops of small bowel were gently teased away from this inflammatory mass to bring the cecum into view.  There was a firm indurated mass of tissue just adjacent to the cecum which presumably was the appendix.  The adherent omentum, small bowel mesentery was gently teased away from this inflammatory mass.  The appendix was slowly brought into view.  The appendix was noted to be fibrotic, firm, erythematous.  There was a small pocket of purulent fluid just adjacent to the appendix.  This was quickly aspirated.   Blunt dissection was performed to bring the full length of the appendix into view.  The mesoappendix was greatly foreshortened.  A small defect was able to be made just adjacent to the base of the appendix.  The endostapler was then passed across the base of the appendix  In a similar fashion the mesoappendix was also stapled across and divided.   During this maneuver, an arterial vessel began to bleed near the mesoappendix.  This was controlled with hemoclips.  The appendix was placed into an Endopouch and was withdrawn through the suprapubic port site under direct vision. The trocar was then replaced.  The staple lines across the mesoappendix and base of the appendix was identified and found to be intact. There was no evidence of bleeding. The right lower quadrant and pelvis was copiously irrigated with 2 L antibiotic irrigation fluid until the irrigant was clear.  Due to the degree of inflammatory changes, an 19 Irish Stan drain was placed into the right lower quadrant and exited through the suprapubic port.  The drain was secured to the skin with a nylon suture.  The accessory ports were removed under direct vision with no evidence of bleeding from the anterior abdominal wall. The abdomen was deflated. The suprapubic and umbilical ports were closed with 0 Vicryl in a figure-of-eight fascial stitch. All skin incisions were closed with 4-0 Vicryl in a subcuticular manner Steri-Strips and sterile dressing were placed.  All sponge instrument and needle counts were correct at the conclusion of the procedure. The patient was extubated in the operating room and was taken to the recovery room in stable condition.    The patient's daughter Ritu was called per the patient's request however there was no answer at number given [phone]      EBL: 20 cc    Pathologic Specimen: Appendix    Drains: Stan drain    Rae Murcia MD      Please note that this report has been produced using speech recognition software and  may contain errors related to that system including but not limited to errors in grammar, punctuation and spelling as well as words and phrases that possibly may have been recognized inappropriately.  If there are any questions or concerns please contact the dictating provider for clarification.  The 21st Century Cures Act makes medical notes like these available to patients in the interest of transparency. Please be advised this is a medical document. Medical documents are intended to carry relevant information, facts as evident, and the clinical opinion of the practitioner. The medical note is intended as peer to peer communication and may appear blunt or direct. It is written in medical language and may contain abbreviations or verbiage that are unfamiliar.  If there are any questions or concerns please contact the dictating provider for clarification.

## 2024-07-05 ENCOUNTER — OFFICE VISIT (OUTPATIENT)
Facility: LOCATION | Age: 49
End: 2024-07-05
Payer: COMMERCIAL

## 2024-07-05 VITALS — HEART RATE: 92 BPM | TEMPERATURE: 97 F | OXYGEN SATURATION: 97 %

## 2024-07-05 DIAGNOSIS — Z98.890 POST-OPERATIVE STATE: Primary | ICD-10-CM

## 2024-07-05 NOTE — PROGRESS NOTES
Post Operative Visit Note       Active Problems  1. Post-operative state         Chief Complaint   Chief Complaint   Patient presents with    Post-Op     PO- LAPAROSCOPIC APPENDECTOMY          History of Present Illness   49 year old female who presents today for postoperative visit following laparoscopic appendectomy on 2024 by Dr. Murcia.    Patient reports increased constipation since surgery and mild abdominal tenderness, states drain was scheduled to be removed today. Patient states drainage has progressively decreased, per patient's log measuring 10-25mL of fluid since the beginning of July. Tolerating diet without difficulties. Denies fever, chills, nausea, or vomiting.        Allergies  Camila is allergic to wellbutrin [bupropion].    Past Medical / Surgical / Social / Family History    The past medical and past surgical history have been reviewed by me today.     Past Medical History:    ADHD (attention deficit hyperactivity disorder)    ANXIETY    DEPRESSION    Diabetes (HCC)    Obesity (BMI 30-39.9)     Past Surgical History:   Procedure Laterality Date          x2       The family history and social history have been reviewed by me today.    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 (Patient not taking: Reported on 6/24/2024), Disp: , Rfl:     metFORMIN HCl  MG Oral Tablet 24 Hr, Take 1 tablet (500 mg total) by mouth daily., Disp: , Rfl:     Clobetasol Propionate 0.05 % External Ointment, Use on AA BID for 1-2 weeks, Disp: 50 g, Rfl: 1    Cholecalciferol (VITAMIN D3) 2000 units Oral Tab, Take 1 tablet (2,000 Units total) by mouth every 7 days. On Saturday, Disp: , Rfl:     Citalopram Hydrobromide (CELEXA) 40 MG Oral Tab, Take 1 tablet by mouth daily., Disp: 30 tablet, Rfl: 6      Review of Systems  A 10 point Review of Systems has been completed by me today and is negative except as above in the HPI.    Physical Findings   Pulse 92   Temp 97 °F (36.1 °C)   SpO2 97%   Gen/psych: alert and oriented, cooperative, no apparent distress  Cardiovascular: regular rate  Respiratory: respirations unlabored, no wheeze  Abdominal: soft, appropriately tender, non-distended, no guarding/rebound  Incisions: Clean, dry, intact without erythema or purulent drainage. Drain is in place.           Assessment/Plan  1. Post-operative state      Start Mirolax and Metamucil fiber for constipation.   Continue 20 lb weight restriction.  Continue diet as tolerated, high-fiber diet advised.  Drain removed. Wound care discussed with patient.  Continue OTC Naproxen for pain management.  Follow-up as needed.       No orders of the defined types were placed in this encounter.       Imaging & Referrals   None    Follow Up  Return if symptoms worsen or fail to improve.      The patient presents today for postoperative visit following laparoscopic appendectomy.      The patient should continue with high-fiber diet.  She may  add Metamucil as needed.  She also should continue with a bowel regimen such as MiraLAX daily as needed to avoid constipation.  Stan drain was removed at the bedside today without difficulty.  Dry dressing to cover.  The patient is to continue with lifting restrictions of no more than 20 pounds for 4 weeks from the date of her surgery.  The patient was put after her surgery that she had a right inguinal hernia.  The patient is asymptomatic and does not feel a bulge or tenderness in the right groin.  I discussed with the patient that if the hernia becomes symptomatic that she should call her office to schedule a visit with a surgeon for possible right inguinal hernia repair.  All questions and concerns were answered.  Return to the clinic as needed.    Riya Michelle PA-C  [facility] [place] [facility]  7/5/2024  1:08 PM

## 2025-05-11 ENCOUNTER — APPOINTMENT (OUTPATIENT)
Dept: GENERAL RADIOLOGY | Facility: HOSPITAL | Age: 50
End: 2025-05-11
Attending: EMERGENCY MEDICINE
Payer: COMMERCIAL

## 2025-05-11 ENCOUNTER — HOSPITAL ENCOUNTER (EMERGENCY)
Facility: HOSPITAL | Age: 50
Discharge: HOME | End: 2025-05-11
Attending: EMERGENCY MEDICINE
Payer: COMMERCIAL

## 2025-05-11 VITALS
TEMPERATURE: 98 F | RESPIRATION RATE: 20 BRPM | OXYGEN SATURATION: 99 % | HEART RATE: 68 BPM | SYSTOLIC BLOOD PRESSURE: 146 MMHG | DIASTOLIC BLOOD PRESSURE: 80 MMHG

## 2025-05-11 DIAGNOSIS — M75.41 SHOULDER IMPINGEMENT SYNDROME, RIGHT: Primary | ICD-10-CM

## 2025-05-11 PROCEDURE — 73030 X-RAY EXAM OF SHOULDER: CPT | Performed by: EMERGENCY MEDICINE

## 2025-05-11 PROCEDURE — 99284 EMERGENCY DEPT VISIT MOD MDM: CPT

## 2025-05-11 PROCEDURE — 99283 EMERGENCY DEPT VISIT LOW MDM: CPT

## 2025-05-11 RX ORDER — ACETAMINOPHEN 500 MG
500 TABLET ORAL ONCE
Status: COMPLETED | OUTPATIENT
Start: 2025-05-11 | End: 2025-05-11

## 2025-05-11 RX ORDER — IBUPROFEN 200 MG
200 TABLET ORAL ONCE
Status: COMPLETED | OUTPATIENT
Start: 2025-05-11 | End: 2025-05-11

## 2025-05-11 NOTE — ED PROVIDER NOTES
Patient Seen in: [facility] Emergency Department      History     Chief Complaint   Patient presents with    Shoulder Injury     Stated Complaint: fell a couple of weeks ago and since has been having her right shoulder pop out*    Subjective:   HPI  49-year-old female presents to the emergency department complaining of right shoulder discomfort.  The patient says that she think she may have dislocated her shoulder.  She says it happened while she was sleeping overnight.  She said she has been having popping and clicking sensation over the past couple weeks in her shoulder.  Symptoms began after she fell 3 weeks ago.  She did not fall directly onto the shoulder she fell onto her outstretched arms.  She has not been taking any medications for pain control.  Says is difficult for her to take her arm through range of motion.  Reviewing her records she was seen on 12/12/2024 for type 2 diabetes.    History of Present Illness               Objective:     No pertinent past medical history.            No pertinent past surgical history.              No pertinent social history.                              Physical Exam     ED Triage Vitals [05/11/25 1200]   BP (!) 151/96   Pulse 80   Resp 20   Temp 97.9 °F (36.6 °C)   Temp src Temporal   SpO2 100 %   O2 Device None (Room air)       Current Vitals:   Vital Signs  BP: (!) 151/96  Pulse: 80  Resp: 20  Temp: 97.9 °F (36.6 °C)  Temp src: Temporal    Oxygen Therapy  SpO2: 100 %  O2 Device: None (Room air)        Physical Exam  General: Nontoxic 49-year-old female appears to be in no distress.  Focused exam of the patient's right upper extremity: She has pain when she attempts to abduct her right arm.  Shoulder appears rounded and not deformed.  Pulses are +2 radial artery pulses.   strength 5 out of 5 on examination of her upper extremity on the right.  She is able to reach across and grab her contralateral shoulder with her right hand.  No tenderness on palpation  over the scapula on the right.  No elbow discomfort.  No humerus discomfort.  No clavicular deformity.  No erythema of the shoulder.  Physical Exam                ED Course   Labs Reviewed - No data to display       Results      Narrative  PROCEDURE:  XR SHOULDER, COMPLETE (MIN 2 VIEWS), RIGHT (CPT=73030)     TECHNIQUE:  Multiple views were obtained.     COMPARISON:  None.     INDICATIONS:  fell a couple of weeks ago and since has been having her right shoulder pop out of place.     PATIENT STATED HISTORY: (As transcribed by Technologist)  Patient stated having lateral right shoulder pain after a fall a couple of days ago.         FINDINGS:  No acute fracture or dislocation.  Degenerative changes at the glenohumeral joint and acromioclavicular joint with joint space narrowing and osteophyte formation.  There is a subacromial enthesophyte.                  Impression  CONCLUSION:  See above        LOCATION:  [facility]        Dictated by (CST): Jez Mackay MD on 5/11/2025 at 12:31 PM      Finalized by (CST): Jez Mackay MD on 5/11/2025 at 12:31 PM                       MDM    Differential diagnosis includes but is not limited to shoulder dislocation, shoulder subluxation, shoulder fracture, impingement syndrome.  X-rays were performed  I reviewed the x-rays and agree with the radiologist report that showed no acute fracture or dislocation.  Degenerative changes at the glenohumeral joint and acromioclavicular joint with joint space narrowing and osteophyte formation.  There is a subacromial enthesophyte.  The patient has impingement syndrome causing symptomology.  Take Tylenol ibuprofen pain control.  Sling for comfort.  Range of motion exercises follow-up with her physician to obtain physical therapy.  Note to Patient  The 21st Century Cures Act makes medical notes like these available to patients in the interest of transparency. However, be advised this is a medical document and is intended as [id number]  communication; it is written in medical language and may appear blunt, direct, or contain abbreviations or verbiage that are unfamiliar. Medical documents are intended to carry relevant information, facts as evident, and the clinical opinion of the practitioner.  Patient was evaluated and a screening exam was performed.   As a treating physician attending to the patient, I determined, within reasonable clinical confidence and prior to discharge, that an emergency medical condition was not or was no longer present.  There was no indication for further evaluation, treatment or admission on an emergency basis.  Comprehensive verbal and written discharge and follow-up instructions were provided to help prevent relapse or worsening.  Patient was instructed to follow-up with their primary care provider for further evaluation and treatment, but to return immediately to the ER for worsening, concerning, new, changing or persisting symptoms.  I discussed the case with the patient and they had no questions, complaints, or concerns.  Patient felt comfortable going home.  ^^Please note that this report has been produced using speech recognition software and may contain errors related to that system including, but not limited to, errors in grammar, punctuation, and spelling, as well as words and phrases that possibly may have been recognized inappropriately.  If there are any questions or concerns, contact the dictating provider for clarification.  Medical Decision Making      Disposition and Plan     Clinical Impression:  1. Shoulder impingement syndrome, right         Disposition:  Discharge  5/11/2025  1:23 pm    Follow-up:  Maia Santana MD  [facility] [address] [place] [id number]  [phone]    Schedule an appointment as soon as possible for a visit in 2 day(s)            Medications Prescribed:  Current Discharge Medication List          Supplementary Documentation:

## 2025-05-11 NOTE — ED INITIAL ASSESSMENT (HPI)
Patient with possible dislocated shoulder. States it happened overnight. Has been \"dislocating\" shoulder for a few weeks now. Started after a fall

## (undated) DEVICE — TROCAR: Brand: KII SHIELDED BLADED ACCESS SYSTEM

## (undated) DEVICE — L-HOOK CAUTERY PROBE TIP, DISPOSABLE: Brand: RENEW

## (undated) DEVICE — COVER LT HNDL RIG FOR SUR CAM DISP

## (undated) DEVICE — POUCH SPECIMEN WIRE 6X3 250ML

## (undated) DEVICE — UNDYED BRAIDED (POLYGLACTIN 910), SYNTHETIC ABSORBABLE SUTURE: Brand: COATED VICRYL

## (undated) DEVICE — DRAIN SUR 19FR L0.25IN 3/4 FLUT 3/16IN TRCR

## (undated) DEVICE — Device: Brand: SUTURE PASSOR PRO

## (undated) DEVICE — SOLUTION IRRIG 1000ML 0.9% NACL USP BTL

## (undated) DEVICE — SLEEVE COMPR MD KNEE LEN SGL USE KENDALL SCD

## (undated) DEVICE — MUCUS TRAP, 10FR, DELEE, W/VA: Brand: MEDLINE INDUSTRIES, INC.

## (undated) DEVICE — ARTICULATION RELOAD WITH TRI-STAPLE TECHNOLOGY: Brand: ENDO GIA

## (undated) DEVICE — ENDOPATH ULTRA VERESS INSUFFLATION NEEDLES WITH LUER LOCK CONNECTORS: Brand: ENDOPATH

## (undated) DEVICE — 40580 - THE PINK PAD - ADVANCED TRENDELENBURG POSITIONING KIT: Brand: 40580 - THE PINK PAD - ADVANCED TRENDELENBURG POSITIONING KIT

## (undated) DEVICE — CLIP APPLIER WITH CLIP LOGIC TECHNOLOGY: Brand: ENDO CLIP III

## (undated) DEVICE — VISUALIZATION SYSTEM: Brand: CLEARIFY

## (undated) DEVICE — EVACUATOR SUR 100CC SIL BLB WND

## (undated) DEVICE — SUT ETHLN 2-0 18IN FS NABSRB BLK 26MM 3/8 CIR

## (undated) DEVICE — APPLICATOR STD 6IN COT TIP WOOD HNDL ST

## (undated) DEVICE — POWER SHELL: Brand: SIGNIA

## (undated) DEVICE — SUT COAT VCRL+ 0 27IN UR-6 ABSRB VLT ANTIBACT

## (undated) DEVICE — GLOVE SUR 6.5 SENSICARE PI PIP CRM PWD F

## (undated) DEVICE — LAP CHOLE/APPY CDS-LF: Brand: MEDLINE INDUSTRIES, INC.

## (undated) DEVICE — TRAY CATH 16FR F INCL BARDX IC COMPLT CARE

## (undated) DEVICE — PTFE COATED BLADE 2.75': Brand: MEDLINE

## (undated) DEVICE — HUNTER GASPER TIP, DISPOSABLE: Brand: RENEW

## (undated) DEVICE — BANDAGE ADH 1INX3IN NAT FAB N ADH PD CURAD

## (undated) DEVICE — SUT VCRL 0 L27IN ABSRB VLT L26MM UR-6 5/8-ZZDISC-USE 421016

## (undated) NOTE — ED AVS SNAPSHOT
Vince Carbone   MRN: [id number]    Department:  [facility] Emergency Department   Date of Visit:  2/27/2020           Disclosure     Insurance plans vary and the physician(s) referred by the ER may not be covered by your plan.  Please contact yo tell this physician (or your personal doctor if your instructions are to return to your personal doctor) about any new or lasting problems. The primary care or specialist physician will see patients referred from the [facility] Emergency Department.  Rufino Velez

## (undated) NOTE — LETTER
[address]  [id number]  Authorization for Surgical Operation and Procedure     Date:___________                                                                                                         Time:__________  I hereby authorize Surgeon(s):  Rea Murcia MD, my physician and his/her assistants (if applicable), which may include medical students, residents, and/or fellows, to perform the following surgical operation/ procedure and administer such anesthesia as may be determined necessary by my physician:  Operation/Procedure name (s) Procedure(s):  LAPAROSCOPIC APPENDECTOMY, POSSIBLE OPEN on Camila Rayo   2.   I recognize that during the surgical operation/procedure, unforeseen conditions may necessitate additional or different procedures than those listed above.  I, therefore, further authorize and request that the above-named surgeon, assistants, or designees perform such procedures as are, in their judgment, necessary and desirable.    3.   My surgeon/physician has discussed prior to my surgery the potential benefits, risks and side effects of this procedure; the likelihood of achieving goals; and potential problems that might occur during recuperation.  They also discussed reasonable alternatives to the procedure, including risks, benefits, and side effects related to the alternatives and risks related to not receiving this procedure.  I have had all my questions answered and I acknowledge that no guarantee has been made as to the result that may be obtained.    4.   Should the need arise during my operation/procedure, which includes change of level of care prior to discharge, I also consent to the administration of blood and/or blood products.  Further, I understand that despite careful testing and screening of blood or blood products by collecting agencies, I may still be subject to ill effects as a result of receiving a blood transfusion and/or blood  products.  The following are some, but not all, of the potential risks that can occur: fever and allergic reactions, hemolytic reactions, transmission of diseases such as Hepatitis, AIDS and Cytomegalovirus (CMV) and fluid overload.  In the event that I wish to have an autologous transfusion of my own blood, or a directed donor transfusion, I will discuss this with my physician.  Check only if Refusing Blood or Blood Products  I understand refusal of blood or blood products as deemed necessary by my physician may have serious consequences to my condition to include possible death. I hereby assume responsibility for my refusal and release the hospital, its personnel, and my physicians from any responsibility for the consequences of my refusal.          o  Refuse      5.   I authorize the use of any specimen, organs, tissues, body parts or foreign objects that may be removed from my body during the operation/procedure for diagnosis, research or teaching purposes and their subsequent disposal by hospital authorities.  I also authorize the release of specimen test results and/or written reports to my treating physician on the hospital medical staff or other referring or consulting physicians involved in my care, at the discretion of the Pathologist or my treating physician.    6.   I consent to the photographing or videotaping of the operations or procedures to be performed, including appropriate portions of my body for medical, scientific, or educational purposes, provided my identity is not revealed by the pictures or by descriptive texts accompanying them.  If the procedure has been photographed/videotaped, the surgeon will obtain the original picture, image, videotape or CD.  The hospital will not be responsible for storage, release or maintenance of the picture, image, tape or CD.    7.   I consent to the presence of a  or observers in the operating room as deemed necessary by my physician or  their designees.    8.   I recognize that in the event my procedure results in extended X-Ray/fluoroscopy time, I may develop a skin reaction.    9. If I have a Do Not Attempt Resuscitation (DNAR) order in place, that status will be suspended while in the operating room, procedural suite, and during the recovery period unless otherwise explicitly stated by me (or a person authorized to consent on my behalf). The surgeon or my attending physician will determine when the applicable recovery period ends for purposes of reinstating the DNAR order.  10. Patients having a sterilization procedure: I understand that if the procedure is successful the results will be permanent and it will therefore be impossible for me to inseminate, conceive, or bear children.  I also understand that the procedure is intended to result in sterility, although the result has not been guaranteed.   11. I acknowledge that my physician has explained sedation/analgesia administration to me including the risk and benefits I consent to the administration of sedation/analgesia as may be necessary or desirable in the judgment of my physician.    I CERTIFY THAT I HAVE READ AND FULLY UNDERSTAND THE ABOVE CONSENT TO OPERATION and/or OTHER PROCEDURE.    _________________________________________  __________________________________  Signature of Patient     Signature of Responsible Person         ___________________________________         Printed Name of Responsible Person           _________________________________                 Relationship to Patient  _________________________________________  ______________________________  Signature of Witness          Date  Time      Patient Name: Camila Rayo     : 1975                 Printed: 2024     Medical Record #: [id number]                     Page [address] of [address] [place] [address]  [id number]    Consent for Anesthesia    ICamila  Jessi Rayo agree to be cared for by an anesthesiologist, who is specially trained to monitor me and give me medicine to put me to sleep or keep me comfortable during my procedure    I understand that my anesthesiologist is not an employee or agent of [facility] [employer]. He or she works for [employer].    As the patient asking for anesthesia services, I agree to:  Allow the anesthesiologist (anesthesia doctor) to give me medicine and do additional procedures as necessary. Some examples are: Starting or using an “IV” to give me medicine, fluids or blood during my procedure, and having a breathing tube placed to help me breathe when I’m asleep (intubation). In the event that my heart stops working properly, I understand that my anesthesiologist will make every effort to sustain my life, unless otherwise directed by [facility] Do Not Resuscitate documents.  Tell my anesthesia doctor before my procedure:  If I am pregnant.  The last time that I ate or drank.  All of the medicines I take (including prescriptions, herbal supplements, and pills I can buy without a prescription (including street drugs/illegal medications). Failure to inform my anesthesiologist about these medicines may increase my risk of anesthetic complications.  If I am allergic to anything or have had a reaction to anesthesia before.  I understand how the anesthesia medicine will help me (benefits).  I understand that with any type of anesthesia medicine there are risks:  The most common risks are: nausea, vomiting, sore throat, muscle soreness, damage to my eyes, mouth, or teeth (from breathing tube placement).  Rare risks include: remembering what happened during my procedure, allergic reactions to medications, injury to my airway, heart, lungs, vision, nerves, or muscles and in extremely rare instances death.  My doctor has explained to me other choices available to me for my care  (alternatives).  Pregnant Patients (“epidural”):  I understand that the risks of having an epidural (medicine given into my back to help control pain during labor), include itching, low blood pressure, difficulty urinating, headache or slowing of the baby’s heart. Very rare risks include infection, bleeding, seizure, irregular heart rhythms and nerve injury.  Regional Anesthesia (“spinal”, “epidural”, & “nerve blocks”):  I understand that rare but potential complications include headache, bleeding, infection, seizure, irregular heart rhythms, and nerve injury.    I can change my mind about having anesthesia services at any time before I get the medicine.    _____________________________________________________________________________  Patient (or Representative) Signature/Relationship to Patient  Date   Time    _____________________________________________________________________________   Name (if used)    Language/Organization   Time    _____________________________________________________________________________  Anesthesiologist Signature     Date   Time  I have discussed the procedure and information above with the patient (or patient’s representative) and answered their questions. The patient or their representative has agreed to have anesthesia services.    _____________________________________________________________________________  Witness        Date   Time  I have verified that the signature is that of the patient or patient’s representative, and that it was signed before the procedure  Patient Name: Camila Rayo     : 1975                 Printed: 2024     Medical Record #: [id number]                     Page 2 of 2